# Patient Record
Sex: MALE | Race: WHITE | NOT HISPANIC OR LATINO | Employment: FULL TIME | URBAN - METROPOLITAN AREA
[De-identification: names, ages, dates, MRNs, and addresses within clinical notes are randomized per-mention and may not be internally consistent; named-entity substitution may affect disease eponyms.]

---

## 2023-10-19 RX ORDER — OXYCODONE AND ACETAMINOPHEN 5; 325 MG/1; MG/1
TABLET ORAL
COMMUNITY

## 2023-10-20 ENCOUNTER — ANCILLARY PROCEDURE (OUTPATIENT)
Dept: NEUROLOGY | Facility: CLINIC | Age: 53
End: 2023-10-20
Payer: COMMERCIAL

## 2023-10-20 VITALS
DIASTOLIC BLOOD PRESSURE: 94 MMHG | BODY MASS INDEX: 27.72 KG/M2 | HEART RATE: 93 BPM | WEIGHT: 216 LBS | SYSTOLIC BLOOD PRESSURE: 161 MMHG | HEIGHT: 74 IN | TEMPERATURE: 97.7 F

## 2023-10-20 DIAGNOSIS — G56.22 ULNAR NEUROPATHY AT ELBOW OF LEFT UPPER EXTREMITY: ICD-10-CM

## 2023-10-20 DIAGNOSIS — G56.82 SUPRASCAPULAR NEUROPATHY, LEFT: Primary | ICD-10-CM

## 2023-10-20 PROCEDURE — 95886 MUSC TEST DONE W/N TEST COMP: CPT | Performed by: PSYCHIATRY & NEUROLOGY

## 2023-10-20 PROCEDURE — 95909 NRV CNDJ TST 5-6 STUDIES: CPT | Performed by: PSYCHIATRY & NEUROLOGY

## 2024-10-22 ENCOUNTER — TELEPHONE (OUTPATIENT)
Dept: ORTHOPEDIC SURGERY | Facility: HOSPITAL | Age: 54
End: 2024-10-22
Payer: COMMERCIAL

## 2024-10-22 NOTE — TELEPHONE ENCOUNTER
Called and left voicemail for Clemente Coronado to call us back to get scheduled for an appointment with Dr. Trevizo as soon as possible per Bessie.

## 2024-11-11 ENCOUNTER — APPOINTMENT (OUTPATIENT)
Dept: ORTHOPEDIC SURGERY | Facility: CLINIC | Age: 54
End: 2024-11-11
Payer: COMMERCIAL

## 2024-11-11 ENCOUNTER — HOSPITAL ENCOUNTER (OUTPATIENT)
Dept: RADIOLOGY | Facility: CLINIC | Age: 54
Discharge: HOME | End: 2024-11-11
Payer: COMMERCIAL

## 2024-11-11 VITALS — BODY MASS INDEX: 26.36 KG/M2 | HEIGHT: 75 IN | WEIGHT: 212 LBS

## 2024-11-11 DIAGNOSIS — M75.101 TEAR OF RIGHT ROTATOR CUFF, UNSPECIFIED TEAR EXTENT, UNSPECIFIED WHETHER TRAUMATIC: ICD-10-CM

## 2024-11-11 DIAGNOSIS — M25.811 SHOULDER IMPINGEMENT, RIGHT: ICD-10-CM

## 2024-11-11 DIAGNOSIS — S43.431A DEGENERATIVE SUPERIOR LABRAL ANTERIOR-TO-POSTERIOR (SLAP) TEAR OF RIGHT SHOULDER: ICD-10-CM

## 2024-11-11 DIAGNOSIS — M25.511 RIGHT SHOULDER PAIN, UNSPECIFIED CHRONICITY: ICD-10-CM

## 2024-11-11 DIAGNOSIS — M65.911 SYNOVITIS OF RIGHT SHOULDER: ICD-10-CM

## 2024-11-11 DIAGNOSIS — S46.011A TRAUMATIC COMPLETE TEAR OF RIGHT ROTATOR CUFF, INITIAL ENCOUNTER: Primary | ICD-10-CM

## 2024-11-11 PROCEDURE — 3008F BODY MASS INDEX DOCD: CPT | Performed by: STUDENT IN AN ORGANIZED HEALTH CARE EDUCATION/TRAINING PROGRAM

## 2024-11-11 PROCEDURE — 99204 OFFICE O/P NEW MOD 45 MIN: CPT | Performed by: STUDENT IN AN ORGANIZED HEALTH CARE EDUCATION/TRAINING PROGRAM

## 2024-11-11 PROCEDURE — 73030 X-RAY EXAM OF SHOULDER: CPT | Mod: RT

## 2024-11-11 PROCEDURE — 1036F TOBACCO NON-USER: CPT | Performed by: STUDENT IN AN ORGANIZED HEALTH CARE EDUCATION/TRAINING PROGRAM

## 2024-11-11 PROCEDURE — 73030 X-RAY EXAM OF SHOULDER: CPT | Mod: RIGHT SIDE | Performed by: RADIOLOGY

## 2024-11-11 ASSESSMENT — PAIN SCALES - GENERAL: PAINLEVEL_OUTOF10: 8

## 2024-11-11 ASSESSMENT — PAIN - FUNCTIONAL ASSESSMENT: PAIN_FUNCTIONAL_ASSESSMENT: 0-10

## 2024-11-11 NOTE — PROGRESS NOTES
PRIMARY CARE PHYSICIAN: No primary care provider on file.  REFERRING PROVIDER: No referring provider defined for this encounter.     CONSULT ORTHOPAEDIC: Shoulder Evaluation    ASSESSMENT & PLAN    Impression: 54 y.o. male with an acute right shoulder injury concerning for a new full thickness rotator cuff tear    Plan:   I explained to the patient the nature of their diagnosis.  I reviewed their imaging studies with them.    Based on the history, physical exam and imaging studies above, the patient's presentation is consistent with the above diagnosis.  I had a long discussion with the patient regarding their presentation and the treatment options.  We discussed initial nonoperative versus operative management options as well as potential further diagnostic imaging. Patient's mechanism of injury, symptoms and physical exam findings are concerning for a new full-thickness rotator cuff tear of the right shoulder. He has significant pain and limitations to the right shoulder since his injury. At this time I recommend proceeding with further diagnostic imaging with an MRI of the right shoulder. The MRI is medically necessary and indicated given his complaints and physical exam findings as described below . The MRI will be used for potential presurgical planning purposes. The MRI should be performed in a hospital setting so the surgeon has immediate access to the images. Patient will follow-up with me once the MRI is completed to review the images and discuss a treatment plan going forward.    Follow-Up: Patient will follow-up once the MRI is completed to review the images and discuss a treatment plan going forward.    At the end of the visit, all questions were answered in full. The patient is in agreement with the plan and recommendations. They will call the office with any questions/concerns.    Post visit addendum:  I called the patient to review his MRI findings with him.  He has a massive rotator cuff tear  involving the entirety of the supraspinatus, anterior aspect of the infraspinatus and the subscapularis; findings consistent with prior biceps tenodesis, degenerative fraying of the labrum without significant degenerative change of the glenohumeral joint, shoulder bursitis, no significant rotator cuff muscle belly atrophy.  After long discussion with the patient, I do recommend surgical invention to repair his rotator cuff.  I believe this will be able to be repaired primarily.  I do not believe he is a candidate for shoulder arthroplasty.  He expressed understanding.  The plan will be for a right shoulder arthroscopy, rotator cuff repair, subacromial decompression acromioplasty, extensive intra-articular debridement and synovectomy.  I thoroughly explained the risks and benefits as well as the expected postoperative timeline for the proposed procedure versus nonoperative management. Risks of this procedure include but are not limited to bleeding, infection, nerve injury, DVT and failure of repair or implant.  The patient expressed understanding and wished to proceed with surgical intervention.  All questions were answered. We will begin the presurgical process and find them a surgical date in a timely fashion that works for them.  He will call the office once he discusses a surgical date with his office.    The patient will require an abduction sling for postoperative joint stability. Additionally, in order to decrease the amount of narcotic pain medication usage and improve their pain control and swelling, I recommend a cryotherapy machine.    Note dictated with [employer] software. Completed without full typed error editing and sent to avoid delay.       SUBJECTIVE  CHIEF COMPLAINT:   Chief Complaint   Patient presents with    Right Shoulder - Pain        HPI: Clemente Coronado is a 54 y.o. patient who presents today with right shoulder pain. Patient injured the right shoulder approximately 3 weeks  ago when was playing volleyball and lunged/reached for a ball. He felt immediate sharp pain localized to the right shoulder and was unable to keep playing. He rested and iced but his discomfort persisted. Approximately one week ago he re-injured his shoulder when throwing a stick into his yard. Since then he has had worsening pain. His pain radiates down his arm and he has lost ROM. Unable to sleep at night due to the pain. He also describes a significant lack of strength and has been unable to perform his work requirements. Patient does have a Hx of a right rotator cuff repair done 6/2021. He had no issues after his surgery and recovered well until this most recent injury. He feels as though his shoulder is swollen. He has been taking Advil as needed.     They deny any associated neck pain.  No numbness, tingling, or paresthesias.    REVIEW OF SYSTEMS  Constitutional: See HPI for pain assessment, No significant weight loss, recent trauma  Cardiovascular: No chest pain, shortness of breath  Respiratory: No difficulty breathing, cough  Gastrointestinal: No nausea, vomiting, diarrhea, constipation  Musculoskeletal: Noted in HPI, positive for pain, restricted motion, stiffness  Integumentary: No rashes, easy bruising, redness   Neurological: no numbness or tingling in extremities, no gait disturbances   Psychiatric: No mood changes, memory changes, social issues  Heme/Lymph: no excessive swelling, easy bruising, excessive bleeding  ENT: No hearing changes  Eyes: No vision changes    No past medical history on file.     No Known Allergies     No past surgical history on file.     No family history on file.     Social History     Socioeconomic History    Marital status:      Spouse name: Not on file    Number of children: Not on file    Years of education: Not on file    Highest education level: Not on file   Occupational History    Not on file   Tobacco Use    Smoking status: Never    Smokeless tobacco: Never  "  Substance and Sexual Activity    Alcohol use: Yes     Comment: Socially    Drug use: Never    Sexual activity: Not on file   Other Topics Concern    Not on file   Social History Narrative    Not on file     Social Drivers of Health     Financial Resource Strain: Not on file   Food Insecurity: Not on file   Transportation Needs: Not on file   Physical Activity: Not on file   Stress: Not on file   Social Connections: Not on file   Intimate Partner Violence: Not on file   Housing Stability: Not on file        CURRENT MEDICATIONS:   Current Outpatient Medications   Medication Sig Dispense Refill    oxyCODONE-acetaminophen (Percocet) 5-325 mg tablet        No current facility-administered medications for this visit.        OBJECTIVE    PHYSICAL EXAM      10/20/2023     3:17 PM   Vitals   Systolic 161   Diastolic 94   Heart Rate 93   Temp 36.5 °C (97.7 °F)   Height (in) 1.88 m (6' 2\")   Weight (lb) 216   BMI 27.73 kg/m2   BSA (m2) 2.26 m2      There is no height or weight on file to calculate BMI.    GENERAL: A/Ox3, NAD. Appears healthy, well nourished  PSYCHIATRIC: Mood stable, appropriate memory recall  EYES: EOM intact, no scleral icterus  CARDIOVASCULAR: Palpable peripheral pulses  LUNGS: Breathing non-labored on room air  SKIN: no erythema, rashes, or ecchymosis     MUSCULOSKELETAL:  Laterality: right Shoulder Exam  - Negative Spurlings, full painless neck ROM  - Skin intact  - No erythema or warmth  - No ecchymosis or soft tissue swelling  - Shoulder ROM:  forward flexion 140 with a hitch at 90, ER 40, unable to perform IR without significant pain  - Strength:      Jobes 5-/5     ER 5-/5     Belly press and bearhug 4/5  - Palpation: positive TTP subacromial space and biceps groove  - Araujo and Neers positive  - Speeds and O'Briens positive    NEUROVASCULAR:  - Neurovascular Status: sensation intact to light touch distally, upper extremity motor grossly intact  - Capillary refill brisk at extremities, Bilateral " peripheral pulses 2+    Imaging: Multiple views of the affected right shoulder(s) demonstrate: no acute osseous abnormality, no fracture, no significant degenerative changes.   X-rays were personally reviewed and interpreted by me.  Radiology reports were reviewed by me as well, if readily available at the time.      Alfonzo Trevizo MD  Attending Surgeon    Sports Medicine Orthopaedic Surgery  [facility] [employer] [facility] [employer]

## 2024-11-12 ENCOUNTER — HOSPITAL ENCOUNTER (OUTPATIENT)
Dept: RADIOLOGY | Facility: CLINIC | Age: 54
Discharge: HOME | End: 2024-11-12
Payer: COMMERCIAL

## 2024-11-12 DIAGNOSIS — M75.101 TEAR OF RIGHT ROTATOR CUFF, UNSPECIFIED TEAR EXTENT, UNSPECIFIED WHETHER TRAUMATIC: ICD-10-CM

## 2024-11-12 PROCEDURE — 73221 MRI JOINT UPR EXTREM W/O DYE: CPT | Mod: RIGHT SIDE | Performed by: RADIOLOGY

## 2024-11-12 PROCEDURE — 73221 MRI JOINT UPR EXTREM W/O DYE: CPT | Mod: RT

## 2024-11-18 ENCOUNTER — TELEPHONE (OUTPATIENT)
Dept: ORTHOPEDIC SURGERY | Facility: CLINIC | Age: 54
End: 2024-11-18
Payer: COMMERCIAL

## 2024-11-18 DIAGNOSIS — M75.101 TEAR OF RIGHT ROTATOR CUFF, UNSPECIFIED TEAR EXTENT, UNSPECIFIED WHETHER TRAUMATIC: ICD-10-CM

## 2024-11-18 PROBLEM — S46.011A TRAUMATIC COMPLETE TEAR OF RIGHT ROTATOR CUFF: Status: ACTIVE | Noted: 2024-11-11

## 2024-11-18 PROBLEM — M65.911 SYNOVITIS OF RIGHT SHOULDER: Status: ACTIVE | Noted: 2024-11-11

## 2024-11-18 PROBLEM — M25.811 SHOULDER IMPINGEMENT, RIGHT: Status: ACTIVE | Noted: 2024-11-11

## 2024-11-18 PROBLEM — S43.431A DEGENERATIVE SUPERIOR LABRAL ANTERIOR-TO-POSTERIOR (SLAP) TEAR OF RIGHT SHOULDER: Status: ACTIVE | Noted: 2024-11-11

## 2024-11-18 NOTE — TELEPHONE ENCOUNTER
Place case request for 12/3/24.  Postop scheduled, calendar updated and PAT ordered.    FYI,   He would like 12/6/24 if we happen to get an cancellation.

## 2024-11-18 NOTE — PROGRESS NOTES
Place case request for 12/3/24  Postop scheduled, calendar updated and PAT ordered    If we happen to have an opening on 12/6/24 that day would be better for them.

## 2024-11-25 ENCOUNTER — APPOINTMENT (OUTPATIENT)
Dept: PREADMISSION TESTING | Facility: HOSPITAL | Age: 54
End: 2024-11-25
Payer: COMMERCIAL

## 2024-11-29 ENCOUNTER — PRE-ADMISSION TESTING (OUTPATIENT)
Dept: PREADMISSION TESTING | Facility: HOSPITAL | Age: 54
End: 2024-11-29
Payer: COMMERCIAL

## 2024-11-29 VITALS
DIASTOLIC BLOOD PRESSURE: 86 MMHG | WEIGHT: 216.71 LBS | HEART RATE: 72 BPM | TEMPERATURE: 97.2 F | BODY MASS INDEX: 26.95 KG/M2 | HEIGHT: 75 IN | SYSTOLIC BLOOD PRESSURE: 147 MMHG | RESPIRATION RATE: 16 BRPM | OXYGEN SATURATION: 100 %

## 2024-11-29 DIAGNOSIS — Z01.818 PREOP TESTING: Primary | ICD-10-CM

## 2024-11-29 DIAGNOSIS — M75.101 TEAR OF RIGHT ROTATOR CUFF, UNSPECIFIED TEAR EXTENT, UNSPECIFIED WHETHER TRAUMATIC: ICD-10-CM

## 2024-11-29 LAB
ALBUMIN SERPL BCP-MCNC: 4.7 G/DL (ref 3.4–5)
ALP SERPL-CCNC: 72 U/L (ref 33–120)
ALT SERPL W P-5'-P-CCNC: 21 U/L (ref 10–52)
ANION GAP SERPL CALC-SCNC: 13 MMOL/L (ref 10–20)
AST SERPL W P-5'-P-CCNC: 22 U/L (ref 9–39)
BASOPHILS # BLD AUTO: 0.04 X10*3/UL (ref 0–0.1)
BASOPHILS NFR BLD AUTO: 0.6 %
BILIRUB SERPL-MCNC: 0.7 MG/DL (ref 0–1.2)
BUN SERPL-MCNC: 18 MG/DL (ref 6–23)
CALCIUM SERPL-MCNC: 10.1 MG/DL (ref 8.6–10.3)
CHLORIDE SERPL-SCNC: 102 MMOL/L (ref 98–107)
CO2 SERPL-SCNC: 27 MMOL/L (ref 21–32)
CREAT SERPL-MCNC: 0.93 MG/DL (ref 0.5–1.3)
EGFRCR SERPLBLD CKD-EPI 2021: >90 ML/MIN/1.73M*2
EOSINOPHIL # BLD AUTO: 0.28 X10*3/UL (ref 0–0.7)
EOSINOPHIL NFR BLD AUTO: 3.9 %
ERYTHROCYTE [DISTWIDTH] IN BLOOD BY AUTOMATED COUNT: 12.1 % (ref 11.5–14.5)
GLUCOSE SERPL-MCNC: 86 MG/DL (ref 74–99)
HCT VFR BLD AUTO: 43.8 % (ref 41–52)
HGB BLD-MCNC: 14.3 G/DL (ref 13.5–17.5)
IMM GRANULOCYTES # BLD AUTO: 0.01 X10*3/UL (ref 0–0.7)
IMM GRANULOCYTES NFR BLD AUTO: 0.1 % (ref 0–0.9)
LYMPHOCYTES # BLD AUTO: 1.74 X10*3/UL (ref 1.2–4.8)
LYMPHOCYTES NFR BLD AUTO: 24 %
MCH RBC QN AUTO: 30.9 PG (ref 26–34)
MCHC RBC AUTO-ENTMCNC: 32.6 G/DL (ref 32–36)
MCV RBC AUTO: 95 FL (ref 80–100)
MONOCYTES # BLD AUTO: 0.84 X10*3/UL (ref 0.1–1)
MONOCYTES NFR BLD AUTO: 11.6 %
NEUTROPHILS # BLD AUTO: 4.33 X10*3/UL (ref 1.2–7.7)
NEUTROPHILS NFR BLD AUTO: 59.8 %
NRBC BLD-RTO: NORMAL /100{WBCS}
PLATELET # BLD AUTO: 235 X10*3/UL (ref 150–450)
POTASSIUM SERPL-SCNC: 4.2 MMOL/L (ref 3.5–5.3)
PROT SERPL-MCNC: 7.8 G/DL (ref 6.4–8.2)
RBC # BLD AUTO: 4.63 X10*6/UL (ref 4.5–5.9)
SODIUM SERPL-SCNC: 138 MMOL/L (ref 136–145)
WBC # BLD AUTO: 7.2 X10*3/UL (ref 4.4–11.3)

## 2024-11-29 PROCEDURE — 85025 COMPLETE CBC W/AUTO DIFF WBC: CPT

## 2024-11-29 PROCEDURE — 80053 COMPREHEN METABOLIC PANEL: CPT

## 2024-11-29 PROCEDURE — 99203 OFFICE O/P NEW LOW 30 MIN: CPT

## 2024-11-29 PROCEDURE — 36415 COLL VENOUS BLD VENIPUNCTURE: CPT

## 2024-11-29 ASSESSMENT — DUKE ACTIVITY SCORE INDEX (DASI)
CAN YOU DO LIGHT WORK AROUND THE HOUSE LIKE DUSTING OR WASHING DISHES: YES
CAN YOU RUN A SHORT DISTANCE: YES
CAN YOU WALK A BLOCK OR TWO ON LEVEL GROUND: YES
CAN YOU DO MODERATE WORK AROUND THE HOUSE LIKE VACUUMING, SWEEPING FLOORS OR CARRYING GROCERIES: YES
CAN YOU DO YARD WORK LIKE RAKING LEAVES, WEEDING OR PUSHING A MOWER: YES
DASI METS SCORE: 9.9
CAN YOU DO HEAVY WORK AROUND THE HOUSE LIKE SCRUBBING FLOORS OR LIFTING AND MOVING HEAVY FURNITURE: YES
TOTAL_SCORE: 58.2
CAN YOU HAVE SEXUAL RELATIONS: YES
CAN YOU CLIMB A FLIGHT OF STAIRS OR WALK UP A HILL: YES
CAN YOU TAKE CARE OF YOURSELF (EAT, DRESS, BATHE, OR USE TOILET): YES
CAN YOU PARTICIPATE IN MODERATE RECREATIONAL ACTIVITIES LIKE GOLF, BOWLING, DANCING, DOUBLES TENNIS OR THROWING A BASEBALL OR FOOTBALL: YES
CAN YOU PARTICIPATE IN STRENOUS SPORTS LIKE SWIMMING, SINGLES TENNIS, FOOTBALL, BASKETBALL, OR SKIING: YES
CAN YOU WALK INDOORS, SUCH AS AROUND YOUR HOUSE: YES

## 2024-11-29 ASSESSMENT — PAIN SCALES - GENERAL: PAINLEVEL_OUTOF10: 5 - MODERATE PAIN

## 2024-11-29 ASSESSMENT — PAIN DESCRIPTION - DESCRIPTORS: DESCRIPTORS: ACHING;SHOOTING

## 2024-11-29 ASSESSMENT — PAIN - FUNCTIONAL ASSESSMENT: PAIN_FUNCTIONAL_ASSESSMENT: 0-10

## 2024-11-29 NOTE — H&P (VIEW-ONLY)
[facility]/PAT Evaluation       Name: Clemente Coronado (Clemente Coronado)  /Age: 1970/54 y.o.     In-Person       Chief Complaint: Tear of right rotator cuff, unspecified tear extent, unspecified whether traumatic, Degenerative superior labral anterior-to-posterior (SLAP) tear of right shoulder, Traumatic complete tear of right rotator cuff, initial encounter, Synovitis of right shoulder    HPI  Patient is an alert and oriented 54 year old male scheduled for a right shoulder arthroscopy, rotator cuff repair, subacromial decompression acromioplasty, extensive intra-articular debridement and synovectomy on 12/3/2024 with Dr. Trevizo for tear of right rotator cuff, unspecified tear extent, unspecified whether traumatic, Degenerative superior labral anterior-to-posterior (SLAP) tear of right shoulder, Traumatic complete tear of right rotator cuff, initial encounter, Synovitis of right shoulder. He endorses mild right shoulder pain which worsens on movement/lifting. PMHX includes OA. Presents to [facility] today for perioperative risk stratification and optimization.     Past Medical History:   Diagnosis Date    OA (osteoarthritis)      Past Surgical History:   Procedure Laterality Date    ARTHROSCOPY SHOULDER W/ OPEN ROTATOR CUFF REPAIR Bilateral     KNEE ARTHROSCOPY W/ DEBRIDEMENT Bilateral     8 total knee arthroscopies    TONSILLECTOMY      TOTAL KNEE ARTHROPLASTY Left      Patient  has no history on file for sexual activity.    No family history on file.    No Known Allergies    Prior to Admission medications    Medication Sig Start Date End Date Taking? Authorizing Provider   oxyCODONE-acetaminophen (Percocet) 5-325 mg tablet     Historical Provider, MD       Review of Systems   Constitutional: Negative for chills, decreased appetite, diaphoresis, fever and malaise/fatigue.   Eyes:  Negative for blurred vision and double vision.   Cardiovascular:  Negative for chest pain, claudication, cyanosis, dyspnea on exertion, irregular  heartbeat, leg swelling, near-syncope and palpitations.   Respiratory:  Negative for cough, hemoptysis, shortness of breath and wheezing.    Endocrine: Negative for cold intolerance, heat intolerance, polydipsia, polyphagia and polyuria.   Gastrointestinal:  Negative for abdominal pain, constipation, diarrhea, dysphagia, nausea and vomiting.   Genitourinary:  Negative for bladder incontinence, dysuria, hematuria, incomplete emptying, nocturia, frequency, pelvic pain and urgency.   Neurological:  Negative for headaches, light-headedness, paresthesias, sensory change and weakness.   Psychiatric/Behavioral:  Negative for altered mental status.    Musculoskeletal: Negative for myalgias. Positive for arthralgias     Vitals and nursing note reviewed.     Physical exam  Constitutional:       Appearance: Normal appearance. He is Overweight.   HENT:      Head: Normocephalic and atraumatic.      Mouth/Throat:      Mouth: Mucous membranes are moist.      Pharynx: Oropharynx is clear.   Eyes:      Extraocular Movements: Extraocular movements intact.      Conjunctiva/sclera: Conjunctivae normal.      Pupils: Pupils are equal, round, and reactive to light.   Cardiovascular:      PMI at left midclavicular line. Normal rate. Regular rhythm. Normal S1. Normal S2.       Murmurs: There is no murmur.      No gallop.  No click. No rub.       No audible carotid bruit     No lower extremity edema on exam  Pulmonary:      Effort: Pulmonary effort is normal.      Breath sounds: Normal breath sounds.   Abdominal:      General: Abdomen is flat. Bowel sounds are normal.      Palpations: Abdomen is soft and non-tender  Musculoskeletal:      Cervical back: Normal range of motion and neck supple.      Shoulder, right: Limited ROM  Skin:     General: Skin is warm and dry.      Capillary Refill: Capillary refill takes less than 2 seconds.   Neurological:      General: No focal deficit present.      Mental Status: He is alert and oriented to person,  "place, and time. Mental status is at baseline.   Psychiatric:         Mood and Affect: Mood normal.         Behavior: Behavior normal.         Thought Content: Thought content normal.         Judgment: Judgment normal.     Vitals and nursing note reviewed. Physical exam within normal limits.     Visit Vitals  /86   Pulse 72   Temp 36.2 °C (97.2 °F) (Temporal)   Resp 16   Ht 1.905 m (6' 3\")   Wt 98.3 kg (216 lb 11.4 oz)   SpO2 100%   BMI 27.09 kg/m²   Smoking Status Never   BSA 2.28 m²      DASI Risk Score      Flowsheet Row Pre-Admission Testing from 11/29/2024 in [facility]   Can you take care of yourself (eat, dress, bathe, or use toilet)?  2.75 filed at 11/29/2024 1649   Can you walk indoors, such as around your house? 1.75 filed at 11/29/2024 1649   Can you walk a block or two on level ground?  2.75 filed at 11/29/2024 1649   Can you climb a flight of stairs or walk up a hill? 5.5 filed at 11/29/2024 1649   Can you run a short distance? 8 filed at 11/29/2024 1649   Can you do light work around the house like dusting or washing dishes? 2.7 filed at 11/29/2024 1649   Can you do moderate work around the house like vacuuming, sweeping floors or carrying groceries? 3.5 filed at 11/29/2024 1649   Can you do heavy work around the house like scrubbing floors or lifting and moving heavy furniture?  8 filed at 11/29/2024 1649   Can you do yard work like raking leaves, weeding or pushing a mower? 4.5 filed at 11/29/2024 1649   Can you have sexual relations? 5.25 filed at 11/29/2024 1649   Can you participate in moderate recreational activities like golf, bowling, dancing, doubles tennis or throwing a baseball or football? 6 filed at 11/29/2024 1649   Can you participate in strenous sports like swimming, singles tennis, football, basketball, or skiing? 7.5 filed at 11/29/2024 1649   DASI SCORE 58.2 filed at 11/29/2024 1649   METS Score (Will be calculated only when all the questions are answered) 9.9 " filed at 11/29/2024 1649          Capmarciei DVT Assessment      Flowsheet Row Pre-Admission Testing from 11/29/2024 in [facility]   DVT Score 4 filed at 11/29/2024 1649   Surgical Factors Major surgery planned, including arthroscopic and laproscopic (1-2 hours) filed at 11/29/2024 1649   BMI 30 or less filed at 11/29/2024 1649          Modified Frailty Index      Flowsheet Row Pre-Admission Testing from 11/29/2024 in [facility]   Non-independent functional status (problems with dressing, bathing, personal grooming, or cooking) 0 filed at 11/29/2024 1649   History of diabetes mellitus  0 filed at 11/29/2024 1649   History of COPD 0 filed at 11/29/2024 1649   History of CHF No filed at 11/29/2024 1649   History of MI 0 filed at 11/29/2024 1649   History of Percutaneous Coronary Intervention, Cardiac Surgery, or Angina No filed at 11/29/2024 1649   Hypertension requiring the use of medication  0 filed at 11/29/2024 1649   Peripheral vascular disease 0 filed at 11/29/2024 1649   Impaired sensorium (cognitive impairement or loss, clouding, or delirium) 0 filed at 11/29/2024 1649   TIA or CVA withouy residual deficit 0 filed at 11/29/2024 1649   Cerebrovascular accident with deficit 0 filed at 11/29/2024 1649   Modified Frailty Index Calculator 0 filed at 11/29/2024 1649          CHADS2 Stroke Risk  Current as of 6 minutes ago        N/A 3 to 100%: High Risk   2 to < 3%: Medium Risk   0 to < 2%: Low Risk     Last Change: N/A          This score determines the patient's risk of having a stroke if the patient has atrial fibrillation.        This score is not applicable to this patient. Components are not calculated.          Revised Cardiac Risk Index      Flowsheet Row Pre-Admission Testing from 11/29/2024 in [facility]   High-Risk Surgery (Intraperitoneal, Intrathoracic,Suprainguinal vascular) 0 filed at 11/29/2024 1649   History of ischemic heart disease (History of MI,  History of positive exercuse test, Current chest paint considered due to myocardial ischemia, Use of nitrate therapy, ECG with pathological Q Waves) 0 filed at 11/29/2024 1649   History of congestive heart failure (pulmonary edemia, bilateral rales or S3 gallop, Paroxysmal nocturnal dyspnea, CXR showing pulmonary vascular redistribution) 0 filed at 11/29/2024 1649   History of cerebrovascular disease (Prior TIA or stroke) 0 filed at 11/29/2024 1649   Pre-operative insulin treatment 0 filed at 11/29/2024 1649   Pre-operative creatinine>2 mg/dl 0 filed at 11/29/2024 1649   Revised Cardiac Risk Calculator 0 filed at 11/29/2024 1649          Apfel Simplified Score    No data to display       Risk Analysis Index Results This Encounter    No data found in the last 10 encounters.       Stop Bang Score      Flowsheet Row Pre-Admission Testing from 11/29/2024 in [facility]   Do you snore loudly? 0 filed at 11/29/2024 1632   Do you often feel tired or fatigued after your sleep? 0 filed at 11/29/2024 1632   Has anyone ever observed you stop breathing in your sleep? 0 filed at 11/29/2024 1632   Do you have or are you being treated for high blood pressure? 0 filed at 11/29/2024 1632   Recent BMI (Calculated) 26.5 filed at 11/29/2024 1632   Is BMI greater than 35 kg/m2? 0=No filed at 11/29/2024 1632   Age older than 50 years old? 1=Yes filed at 11/29/2024 1632   Is your neck circumference greater than 17 inches (Male) or 16 inches (Female)? 0 filed at 11/29/2024 1632   Gender - Male 1=Yes filed at 11/29/2024 1632   STOP-BANG Total Score 2 filed at 11/29/2024 1632          Prodigy: High Risk  Total Score: 8              Prodigy Gender Score          ARISCAT Score for Postoperative Pulmonary Complications      Flowsheet Row Pre-Admission Testing from 11/29/2024 in [facility]   Age, years  3 filed at 11/29/2024 1649   Preoperative SpO2 0 filed at 11/29/2024 1649   Respiratory infection in the last  month Either upper or lower (i.e., URI, bronchitis, pneumonia), with fever and antibiotic treatment 0 filed at 11/29/2024 1649   Preoperative anemoa (Hgb less than 10 g/dl) 0 filed at 11/29/2024 1649   Surgical incision  0 filed at 11/29/2024 1649   Duration of surgery  0 filed at 11/29/2024 1649   Emergency Procedure  0 filed at 11/29/2024 1649   ARISCAT Total Score  3 filed at 11/29/2024 1649          Maninder Perioperative Risk for Myocardial Infarction or Cardiac Arrest (ADALI)      Flowsheet Row Pre-Admission Testing from 11/29/2024 in [facility]   Age 1.12 filed at 11/29/2024 1650   Functional Status  0 filed at 11/29/2024 1650   ASA Class  -5.17 filed at 11/29/2024 1650   Creatinine 0 filed at 11/29/2024 1650   Type of Procedure  0.80 filed at 11/29/2024 1650   ADALI Total Score  -8.5 filed at 11/29/2024 1650   ADALI % 0.02 filed at 11/29/2024 1650          Assessment & Plan:    Neuro:  No diagnosis or significant findings on chart review or clinical presentation and evaluation.     HEENT/Airway:  No diagnosis or significant findings on chart review or clinical presentation and evaluation.   STOP-BANG Score-2 points low risk for BRENDA    Mallampati::  II    TM distance::  >3 FB    Neck ROM::  Full  Dentures-denies  Crowns-denies  Implants-denies    Cardiovascular:  No diagnosis or significant findings on chart review or clinical presentation and evaluation.   METS: 9.9  RCRI: 0 points, 3.9%  risk for postoperative MACE   ADALI: 0.02% risk for postoperative MACE    Pulmonary:  No diagnosis or significant findings on chart review or clinical presentation and evaluation.   ARISCAT: <26 points, 1.6% risk of in-hospital postoperative pulmonary complication  PRODIGY: Low risk for opioid induced respiratory depression  Smoking History-He has never smoked.  Discussed smoking cessation and deep breathing handout given    Renal/Urinary:  No diagnosis or significant findings on chart review or clinical  presentation and evaluation, however, the patient is at increased risk of perioperative renal complications secondary to age>/= 56 and male sex. Preventative measures include BP monitoring, medication compliance, and hydration management.   CMP-Reviewed, stable  Creatinine-0.93  GFR->90    Endocrine:  No diagnosis or significant findings on chart review or clinical presentation and evaluation.     Hematologic/Immunology:  No diagnosis or significant findings on chart review or clinical presentation and evaluation.  The patient is not a Jehovah’s witness and will accept blood and blood products if medically indicated.   History of previous blood transfusions No  CBC-Reviewed, stable  HGB-14.3  Caprini Score 4, patient at Moderate for postoperative DVT. Pt supplied education/VTE handout  Anticoagulation use: No     Gastrointestinal:   No diagnosis or significant findings on chart review or clinical presentation and evaluation.   Recreational drug use: Drug use No  Alcohol use social drinker    Infectious disease:   No diagnosis or significant findings on chart review or clinical presentation and evaluation.     Musculoskeletal:   No diagnosis or significant findings on chart review or clinical presentation and evaluation.   JHFRAT score-0 points. low risk for falls    Anesthesia:  ASA 1 - Normal health patient  Anticipated anesthesia-Regional  History of General anesthesia- yes  Complications- No anesthesia complications  No family history of anesthesia complications    Abnormalities noted on PAT evaluation: No    Labs & Imaging ordered:  CBC, CMP  Nickel/metal allergy-negative  Shellfish allergy-negative    Overall, patient Low Risk for the scheduled Moderate Risk surgery. Discussed with patient medication instructions, NPO guidelines, and any questions or concerns.     Face to face time-25 minutes

## 2024-11-29 NOTE — DISCHARGE INSTRUCTIONS
Alfozno Trevizo M.D.   Sports Medicine Orthopaedic Surgery    [facility]  [address] [facility]                  [address] [facility]       [address]  [facility], [address]   Phone: [phone]         Phone: [phone]       Phone: [phone]   Fax: [phone]                         Fax: [phone]       Fax: [phone]     After hours phone number: [phone]    AFTER SURGERY     Anesthesia  If you received a nerve block during surgery, you may have numbness or inability to move the limb. Do not be alarmed as this may last 8-36 hours depending upon the amount and type of medication used by the anesthesiologist. Make sure If you are experiencing numbness after 36 hours, please call the office. When the nerve block begins to wear off, you will feel a tingling sensation, like pins and needles. It is important that you start taking the pain medication at that time to ensure that you “stay ahead of the pain.” It is important to take the pain medicine when the pain level is a 4 or 5/10, before it gets too high.    Do not operate heavy machinery, make major decisions, drink alcohol or smoke for 24 hours. Do not drink alcohol while taking pain medications.    Prescribed Medications   Narcotic pain medicine (Oxycodone): The goal of post-operative pain management is pain control, NOT pain elimination. You should expect some pain after surgery - this pain helps you protect itself while it is healing. Constipation, nausea, itching, and drowsiness are side effects of this type of medication. You should take an over-the-counter stool softener (Colace and/or Senna) while taking narcotics to prevent constipation. If you experience itching, over the counter Benadryl may be helpful. Narcotic pain medications often produce drowsiness and it is against the law to operate a vehicle  while taking these medications. If you are taking oxycodone, you should take acetaminophen (Tylenol) around the clock to decrease baseline pain. Do not take Tylenol-containing products while on Percocet or Rembert.   Refill Policy: For concerns over your safety due to the rising opioid addiction epidemic in the [place], refills of your narcotic pain medications will only be provided on a case by case basis. Please use these medications judiciously.    Anti-inflammatory (NSAID) medicine (Naproxen or Mobic): These are both anti-inflammatory and pain relief. Do NOT take this medication if you have had an ulcer in the past unless you have cleared this with your primary care doctor. You should take NSAIDs with food or antacid to reduce the chance of upset stomach. Depending on your surgery, Dr. Trevizo may instruct you to avoid these medications.    Anti-nausea medicine (ondansetron/Zofran): sometimes patients experience nausea related to either anesthesia or the narcotic pain medication. If this is the case you will find this medication helpful.    DVT prophylaxis (Aspirin or Eliquis): For most patients, activity alone is sufficient to prevent dangerous blood clots, but in some cases your personal risk profile and/or the type of surgery you have undergone makes it necessary that you take medication to help prevent blood clots. Dr. Trevizo will inform you if you are to start one of these medications postoperatively.    Stool softener (Colace and/or Senna): are available over the counter at your local pharmacy and should be taken while you are taking narcotic pain medication to avoid constipation. You should stop taking these medications if you develop diarrhea. Over the counter laxatives may be used if you develop painful constipation.     Diet   Start with clear liquids (water, juice, Gatorade) and light foods (jello, soup, crackers). Progress to normal diet as tolerated if you are not nauseated. Avoid greasy or spicy  foods for the first 24hrs to avoid GI upset. Increase fluid intake to help prevent constipation.    Dressings / Wound Care  You may remove the outer dressing after 3 days and then can shower. (If you have a splint, please leave the splint in place until follow-up.) Do not remove Steri-strips (white stickers) if present over your incisions. Steri-strips may fall off on their own, which is normal. After the bandage has been removed, you may leave the incisions open to air. Alternatively, if you prefer to keep them covered, you may do so with Band-Aids, a light gauze dressing, or a clean ACE wrap. You may shower after the bandage has been removed (3 days), but it is very important that you pat the wounds dry after the shower. It is OK to allow soap and water to run over the wound - DO NOT soak or scrub the wound. Outside of the shower, keep your incision clean and dry until your first postoperative visit, approximately 10-14 days after surgery. You may remove your sling or brace to shower, unless otherwise instructed. As your balance may be affected by recent surgery, we recommend placing a plastic chair in the shower to help prevent falls. Do NOT take baths, go into a pool, or soak the operative site until approved by Dr. Trevizo.    Bracing / Physical Therapy   If you were given one, make sure you wear sling or brace at all times until your follow-up with Dr. Trevizo. Only remove your sling or brace for physical therapy, home exercises, and hygiene. These are typically used for 4-6 weeks after surgery in order to protect the healing of the tissue.     Physical therapy is just as important to your recovery as the actual operation! If you were given a prescription for physical therapy, make sure you go to your appointments and do your exercises daily at home (especially motion exercises).     Ice is a very important part of your recovery. It helps reduce inflammation and improves pain control. You should ice a few times  each day for 20-30 minutes at a time. Please make sure there is something under the ice (clean towel, cloth, T-shirt) so that the ice doesn't directly contact your skin. If you ordered a commercially available ice machine (optional) and a compression setting is available, you should use LOW or no compression during the first 5 days. After that, you may increase compression setting as tolerated. If the compression is bothering you then do not use compression.    Driving / Travel  Ultimately, it is your judgment to decide when you are safe to drive, but if you are at all unsure, do not risk your life or someone else's. As a general guideline, you will not be able to drive for 4-6 weeks after surgery. You should certainly not drive while on narcotics pain medication or while in a brace or sling.     Avoid flights and long distance traveling for 6 weeks after surgery. It is important to discuss your travel plans with Dr. Trevizo, as additional medications may need to be prescribed to help prevent blood clots if certain travel is unavoidable.     Return to Work or School   Your return to work will depend on what surgery was done and what type of work you do. Please note that these are general guidelines, and there may be modifications based on your unique situation. Typically, you may return to sedentary work or school 3-7 days after surgery if pain is tolerable and you are no longer requiring narcotic pain medication. In conjunction with your input, Dr. Trevizo will determine when you may return to more physically rigorous demands.     If you had Knee or Hip Surgery   If your surgery involves a ligament reconstruction or tendon repair, you will typically be prescribed crutches for at least the first few days until pain and the postoperative protocol allows you to fully bear weight and also wear a brace for 4-6 weeks. If cartilage surgery or meniscus repair is performed, you may be on crutches for 6 weeks. Individual  rehabilitation guidelines will vary based upon the unique situation and surgery of every patient, but take these general guidelines into account when planning return to work.     If you had Shoulder Surgery   If your surgery involves a repair (rotator cuff repair, labral repair), you will have a sling on for six weeks after surgery. If you have a sling, you will need to wear it all day. Please wear the sling at all times except for bathing for the first 2 weeks minimum. As long as you can abide by the restrictions, you can return to work when you feel like you can do so safely. However, you will need to take into consideration driving and activities related to your job. You may be able to safely loosen it if you are able to keep your arm supported. Please understand that you will NOT be able to work with your arm away from your body, above shoulder level, or use your arm against gravity for approximately 8 weeks. For jobs that require physical labor, you may require four months or more to return to work. If your surgery does NOT involve a repair (subacromial decompression, distal clavicle excision, capsular release), then you will be in a sling for only a few days after surgery. When comfortable, you may return to work when ready to conduct normal activities of your job. Remember that you may be on narcotic pain medications and these should be discontinued prior to your return to work. For jobs that require physical labor, you may require 6 weeks or more to return to work.     If you had Elbow or Wrist Surgery   If your surgery involves a repair or reconstruction, you will have a splint and sling on followed by a brace for four to six weeks after surgery. As long as you can abide by the restrictions, you can return to work when you feel like you can do so safely. However, you will need to take into consideration driving and activities related to your job. If you have a sling, you will need to wear it all day unless  otherwise instructed. You may be able to safely loosen it if you are able to keep your arm supported. For jobs that require physical labor, you may require four months or more to return to work.    If you had Ankle Surgery   If your surgery involves a repair or reconstruction, you will have a splint followed by a walking boot for four to six weeks after surgery. As long as you can abide by the restrictions, you can return to work when you feel like you can do so safely. However, you will need to take into consideration driving and activities related to your job. For jobs that require physical labor, you may require four months or more to return to work.    Normal Sensations and Findings after Surgery:   PAIN: We do everything possible to make your pain/discomfort level tolerable, but some amount of pain is to be expected.   WARMTH: Mild warmth around the operative site is normal for up to 3 weeks.   REDNESS: Small amount of redness where the sutures enter the skin is normal. If redness worsens or spreads it is important that you contact the office.   DRAINAGE: A small amount is normal for the first 48-72 hours. If wounds continue to drain after this time (requiring multiple gauze changes per day), please contact the office.   NUMBNESS: Around the incision is common.   BRUISING: Is common and often tracks down the arm or leg due to gravity and results in an alarming appearance, but is common and will resolve with time.   FEVER: Low-grade fevers (less than 101.5°F) are common during the first week after surgery. You should drink plenty of fluids and breathe deeply.   CONSTIPATION: Post-operative pain medications as well as immobility can lead to constipation. Please consider taking an over the counter stool softener such as Colace and/or Senna if you experience constipation or if you have a history of constipation.    Follow-Up   A Follow-up appointment should be arranged for 10-14 days after surgery. If one has not  been provided, please call the office to schedule.       NOTIFY US IMMEDIATELY FOR ANY OF THE FOLLOWING:   Most orthopedic surgical procedures are uneventful. However, complications can occur. The following are things to be aware of in the immediate postoperative period.   FEVER - Temperature rises above 101.5°F or associated chills/sweats   WOUND - If you notice drainage more than 4 days after surgery, if the drainage turns yellows and foul smelling, if you need to change gauze multiple times per day, or if sutures become loose.   CARDIOVASCULAR - Chest pain, shortness of breath, palpitations, or fainting spells must be taken seriously. Go to the emergency room (or call 911) immediately for evaluation.   BLOOD CLOTS - Orthopaedic surgery patients are at risk for blood clots. While the risk is higher for lower extremity surgery, even those who have undergone upper extremity surgery are at an increased risk. Please notify Dr. Trevizo if you or someone in your family has had blood clots or any type of known clotting disorder. Signs of blood clots may include calf pain or cramping, diffuse swelling in the leg and foot, or chest pain and shortness of breath. Please call the office or go to the hospital if you recognize any of these symptoms.   NAUSEA - If you have severe vomiting, diarrhea, or constipation, or cannot keep any liquid down   URINARY RETENTION - If you cannot urinate the night after surgery, please go to the Emergency Room.

## 2024-11-29 NOTE — CPM/PAT H&P
[facility]/PAT Evaluation       Name: Clemente Coronado (Clemente Coronado)  /Age: 1970/54 y.o.     In-Person       Chief Complaint: Tear of right rotator cuff, unspecified tear extent, unspecified whether traumatic, Degenerative superior labral anterior-to-posterior (SLAP) tear of right shoulder, Traumatic complete tear of right rotator cuff, initial encounter, Synovitis of right shoulder    HPI  Patient is an alert and oriented 54 year old male scheduled for a right shoulder arthroscopy, rotator cuff repair, subacromial decompression acromioplasty, extensive intra-articular debridement and synovectomy on 12/3/2024 with Dr. Trevizo for tear of right rotator cuff, unspecified tear extent, unspecified whether traumatic, Degenerative superior labral anterior-to-posterior (SLAP) tear of right shoulder, Traumatic complete tear of right rotator cuff, initial encounter, Synovitis of right shoulder. He endorses mild right shoulder pain which worsens on movement/lifting. PMHX includes OA. Presents to [facility] today for perioperative risk stratification and optimization.     Past Medical History:   Diagnosis Date    OA (osteoarthritis)      Past Surgical History:   Procedure Laterality Date    ARTHROSCOPY SHOULDER W/ OPEN ROTATOR CUFF REPAIR Bilateral     KNEE ARTHROSCOPY W/ DEBRIDEMENT Bilateral     8 total knee arthroscopies    TONSILLECTOMY      TOTAL KNEE ARTHROPLASTY Left      Patient  has no history on file for sexual activity.    No family history on file.    No Known Allergies    Prior to Admission medications    Medication Sig Start Date End Date Taking? Authorizing Provider   oxyCODONE-acetaminophen (Percocet) 5-325 mg tablet     Historical Provider, MD       Review of Systems   Constitutional: Negative for chills, decreased appetite, diaphoresis, fever and malaise/fatigue.   Eyes:  Negative for blurred vision and double vision.   Cardiovascular:  Negative for chest pain, claudication, cyanosis, dyspnea on exertion, irregular  heartbeat, leg swelling, near-syncope and palpitations.   Respiratory:  Negative for cough, hemoptysis, shortness of breath and wheezing.    Endocrine: Negative for cold intolerance, heat intolerance, polydipsia, polyphagia and polyuria.   Gastrointestinal:  Negative for abdominal pain, constipation, diarrhea, dysphagia, nausea and vomiting.   Genitourinary:  Negative for bladder incontinence, dysuria, hematuria, incomplete emptying, nocturia, frequency, pelvic pain and urgency.   Neurological:  Negative for headaches, light-headedness, paresthesias, sensory change and weakness.   Psychiatric/Behavioral:  Negative for altered mental status.    Musculoskeletal: Negative for myalgias. Positive for arthralgias     Vitals and nursing note reviewed.     Physical exam  Constitutional:       Appearance: Normal appearance. He is Overweight.   HENT:      Head: Normocephalic and atraumatic.      Mouth/Throat:      Mouth: Mucous membranes are moist.      Pharynx: Oropharynx is clear.   Eyes:      Extraocular Movements: Extraocular movements intact.      Conjunctiva/sclera: Conjunctivae normal.      Pupils: Pupils are equal, round, and reactive to light.   Cardiovascular:      PMI at left midclavicular line. Normal rate. Regular rhythm. Normal S1. Normal S2.       Murmurs: There is no murmur.      No gallop.  No click. No rub.       No audible carotid bruit     No lower extremity edema on exam  Pulmonary:      Effort: Pulmonary effort is normal.      Breath sounds: Normal breath sounds.   Abdominal:      General: Abdomen is flat. Bowel sounds are normal.      Palpations: Abdomen is soft and non-tender  Musculoskeletal:      Cervical back: Normal range of motion and neck supple.      Shoulder, right: Limited ROM  Skin:     General: Skin is warm and dry.      Capillary Refill: Capillary refill takes less than 2 seconds.   Neurological:      General: No focal deficit present.      Mental Status: He is alert and oriented to person,  "place, and time. Mental status is at baseline.   Psychiatric:         Mood and Affect: Mood normal.         Behavior: Behavior normal.         Thought Content: Thought content normal.         Judgment: Judgment normal.     Vitals and nursing note reviewed. Physical exam within normal limits.     Visit Vitals  /86   Pulse 72   Temp 36.2 °C (97.2 °F) (Temporal)   Resp 16   Ht 1.905 m (6' 3\")   Wt 98.3 kg (216 lb 11.4 oz)   SpO2 100%   BMI 27.09 kg/m²   Smoking Status Never   BSA 2.28 m²      DASI Risk Score      Flowsheet Row Pre-Admission Testing from 11/29/2024 in [facility]   Can you take care of yourself (eat, dress, bathe, or use toilet)?  2.75 filed at 11/29/2024 1649   Can you walk indoors, such as around your house? 1.75 filed at 11/29/2024 1649   Can you walk a block or two on level ground?  2.75 filed at 11/29/2024 1649   Can you climb a flight of stairs or walk up a hill? 5.5 filed at 11/29/2024 1649   Can you run a short distance? 8 filed at 11/29/2024 1649   Can you do light work around the house like dusting or washing dishes? 2.7 filed at 11/29/2024 1649   Can you do moderate work around the house like vacuuming, sweeping floors or carrying groceries? 3.5 filed at 11/29/2024 1649   Can you do heavy work around the house like scrubbing floors or lifting and moving heavy furniture?  8 filed at 11/29/2024 1649   Can you do yard work like raking leaves, weeding or pushing a mower? 4.5 filed at 11/29/2024 1649   Can you have sexual relations? 5.25 filed at 11/29/2024 1649   Can you participate in moderate recreational activities like golf, bowling, dancing, doubles tennis or throwing a baseball or football? 6 filed at 11/29/2024 1649   Can you participate in strenous sports like swimming, singles tennis, football, basketball, or skiing? 7.5 filed at 11/29/2024 1649   DASI SCORE 58.2 filed at 11/29/2024 1649   METS Score (Will be calculated only when all the questions are answered) 9.9 " filed at 11/29/2024 1649          Capmarciei DVT Assessment      Flowsheet Row Pre-Admission Testing from 11/29/2024 in [facility]   DVT Score 4 filed at 11/29/2024 1649   Surgical Factors Major surgery planned, including arthroscopic and laproscopic (1-2 hours) filed at 11/29/2024 1649   BMI 30 or less filed at 11/29/2024 1649          Modified Frailty Index      Flowsheet Row Pre-Admission Testing from 11/29/2024 in [facility]   Non-independent functional status (problems with dressing, bathing, personal grooming, or cooking) 0 filed at 11/29/2024 1649   History of diabetes mellitus  0 filed at 11/29/2024 1649   History of COPD 0 filed at 11/29/2024 1649   History of CHF No filed at 11/29/2024 1649   History of MI 0 filed at 11/29/2024 1649   History of Percutaneous Coronary Intervention, Cardiac Surgery, or Angina No filed at 11/29/2024 1649   Hypertension requiring the use of medication  0 filed at 11/29/2024 1649   Peripheral vascular disease 0 filed at 11/29/2024 1649   Impaired sensorium (cognitive impairement or loss, clouding, or delirium) 0 filed at 11/29/2024 1649   TIA or CVA withouy residual deficit 0 filed at 11/29/2024 1649   Cerebrovascular accident with deficit 0 filed at 11/29/2024 1649   Modified Frailty Index Calculator 0 filed at 11/29/2024 1649          CHADS2 Stroke Risk  Current as of 6 minutes ago        N/A 3 to 100%: High Risk   2 to < 3%: Medium Risk   0 to < 2%: Low Risk     Last Change: N/A          This score determines the patient's risk of having a stroke if the patient has atrial fibrillation.        This score is not applicable to this patient. Components are not calculated.          Revised Cardiac Risk Index      Flowsheet Row Pre-Admission Testing from 11/29/2024 in [facility]   High-Risk Surgery (Intraperitoneal, Intrathoracic,Suprainguinal vascular) 0 filed at 11/29/2024 1649   History of ischemic heart disease (History of MI,  History of positive exercuse test, Current chest paint considered due to myocardial ischemia, Use of nitrate therapy, ECG with pathological Q Waves) 0 filed at 11/29/2024 1649   History of congestive heart failure (pulmonary edemia, bilateral rales or S3 gallop, Paroxysmal nocturnal dyspnea, CXR showing pulmonary vascular redistribution) 0 filed at 11/29/2024 1649   History of cerebrovascular disease (Prior TIA or stroke) 0 filed at 11/29/2024 1649   Pre-operative insulin treatment 0 filed at 11/29/2024 1649   Pre-operative creatinine>2 mg/dl 0 filed at 11/29/2024 1649   Revised Cardiac Risk Calculator 0 filed at 11/29/2024 1649          Apfel Simplified Score    No data to display       Risk Analysis Index Results This Encounter    No data found in the last 10 encounters.       Stop Bang Score      Flowsheet Row Pre-Admission Testing from 11/29/2024 in [facility]   Do you snore loudly? 0 filed at 11/29/2024 1632   Do you often feel tired or fatigued after your sleep? 0 filed at 11/29/2024 1632   Has anyone ever observed you stop breathing in your sleep? 0 filed at 11/29/2024 1632   Do you have or are you being treated for high blood pressure? 0 filed at 11/29/2024 1632   Recent BMI (Calculated) 26.5 filed at 11/29/2024 1632   Is BMI greater than 35 kg/m2? 0=No filed at 11/29/2024 1632   Age older than 50 years old? 1=Yes filed at 11/29/2024 1632   Is your neck circumference greater than 17 inches (Male) or 16 inches (Female)? 0 filed at 11/29/2024 1632   Gender - Male 1=Yes filed at 11/29/2024 1632   STOP-BANG Total Score 2 filed at 11/29/2024 1632          Prodigy: High Risk  Total Score: 8              Prodigy Gender Score          ARISCAT Score for Postoperative Pulmonary Complications      Flowsheet Row Pre-Admission Testing from 11/29/2024 in [facility]   Age, years  3 filed at 11/29/2024 1649   Preoperative SpO2 0 filed at 11/29/2024 1649   Respiratory infection in the last  month Either upper or lower (i.e., URI, bronchitis, pneumonia), with fever and antibiotic treatment 0 filed at 11/29/2024 1649   Preoperative anemoa (Hgb less than 10 g/dl) 0 filed at 11/29/2024 1649   Surgical incision  0 filed at 11/29/2024 1649   Duration of surgery  0 filed at 11/29/2024 1649   Emergency Procedure  0 filed at 11/29/2024 1649   ARISCAT Total Score  3 filed at 11/29/2024 1649          Maninder Perioperative Risk for Myocardial Infarction or Cardiac Arrest (ADALI)      Flowsheet Row Pre-Admission Testing from 11/29/2024 in [facility]   Age 1.12 filed at 11/29/2024 1650   Functional Status  0 filed at 11/29/2024 1650   ASA Class  -5.17 filed at 11/29/2024 1650   Creatinine 0 filed at 11/29/2024 1650   Type of Procedure  0.80 filed at 11/29/2024 1650   ADALI Total Score  -8.5 filed at 11/29/2024 1650   ADALI % 0.02 filed at 11/29/2024 1650          Assessment & Plan:    Neuro:  No diagnosis or significant findings on chart review or clinical presentation and evaluation.     HEENT/Airway:  No diagnosis or significant findings on chart review or clinical presentation and evaluation.   STOP-BANG Score-2 points low risk for BRENDA    Mallampati::  II    TM distance::  >3 FB    Neck ROM::  Full  Dentures-denies  Crowns-denies  Implants-denies    Cardiovascular:  No diagnosis or significant findings on chart review or clinical presentation and evaluation.   METS: 9.9  RCRI: 0 points, 3.9%  risk for postoperative MACE   ADALI: 0.02% risk for postoperative MACE    Pulmonary:  No diagnosis or significant findings on chart review or clinical presentation and evaluation.   ARISCAT: <26 points, 1.6% risk of in-hospital postoperative pulmonary complication  PRODIGY: Low risk for opioid induced respiratory depression  Smoking History-He has never smoked.  Discussed smoking cessation and deep breathing handout given    Renal/Urinary:  No diagnosis or significant findings on chart review or clinical  presentation and evaluation, however, the patient is at increased risk of perioperative renal complications secondary to age>/= 54 and male sex. Preventative measures include BP monitoring, medication compliance, and hydration management.   CMP-Reviewed, stable  Creatinine-0.93  GFR->90    Endocrine:  No diagnosis or significant findings on chart review or clinical presentation and evaluation.     Hematologic/Immunology:  No diagnosis or significant findings on chart review or clinical presentation and evaluation.  The patient is not a Jehovah’s witness and will accept blood and blood products if medically indicated.   History of previous blood transfusions No  CBC-Reviewed, stable  HGB-14.3  Caprini Score 4, patient at Moderate for postoperative DVT. Pt supplied education/VTE handout  Anticoagulation use: No     Gastrointestinal:   No diagnosis or significant findings on chart review or clinical presentation and evaluation.   Recreational drug use: Drug use No  Alcohol use social drinker    Infectious disease:   No diagnosis or significant findings on chart review or clinical presentation and evaluation.     Musculoskeletal:   No diagnosis or significant findings on chart review or clinical presentation and evaluation.   JHFRAT score-0 points. low risk for falls    Anesthesia:  ASA 1 - Normal health patient  Anticipated anesthesia-Regional  History of General anesthesia- yes  Complications- No anesthesia complications  No family history of anesthesia complications    Abnormalities noted on PAT evaluation: No    Labs & Imaging ordered:  CBC, CMP  Nickel/metal allergy-negative  Shellfish allergy-negative    Overall, patient Low Risk for the scheduled Moderate Risk surgery. Discussed with patient medication instructions, NPO guidelines, and any questions or concerns.     Face to face time-25 minutes

## 2024-11-29 NOTE — PREPROCEDURE INSTRUCTIONS
Medication List      as of November 29, 2024  4:41 PM     You have not been prescribed any medications.       NPO Instructions:     Do not eat any food after midnight the night before your surgery/procedure.  You may have clear liquids until TWO hours before surgery/procedure. This includes water, black tea/coffee, (no milk or cream) apple juice and electrolyte drinks (Gatorade).  You may chew gum up to TWO hours before your surgery/procedure.     Additional Instructions:      Seven/Six Days before Surgery:  Review your medication instructions, stop indicated medications  Five Days before Surgery:  Review your medication instructions, stop indicated medications  Three Days before Surgery:  Review your medication instructions, stop indicated medications  The Day before Surgery:  No smoking or alcohol use 24 hours before surgery  Review your medication instructions, stop indicated medications  You will be contacted regarding the time of your arrival to facility and surgery time  Do not eat any food after Midnight  Day of Surgery:  Review your medication instructions, take indicated medications  If you have diabetes, please check your fasting blood sugar upon awakening.  If fasting blood sugar is <80 mg/dl, drink 100 ml of apple juice, time limit of 2 hours before  You may have clear liquids until TWO hours before surgery/procedure.  This includes water, black tea/coffee, (no milk or cream) apple juice and electrolyte drinks (Gatorade)  You may chew gum up to TWO hours before your surgery/procedure  Wear  comfortable loose fitting clothing  Do not use moisturizers, creams, lotions or perfume  All jewelry and valuables should be left at home     CONTACT SURGEON'S OFFICE IF YOU DEVELOP:  * Fever = 100.4 F   * New respiratory symptoms (e.g. cough, shortness of breath, respiratory distress, sore throat)  * Recent loss of taste or smell  *Flu like symptoms such as headache, fatigue or gastrointestinal symptoms  * You  develop any open sores, shingles, burning or painful urination   AND/OR:  * You no longer wish to have the surgery.  * Any other personal circumstances change that may lead to the need to cancel or defer this surgery.  *You were admitted to any hospital within one week of your planned procedure.     SMOKING:  *Quitting smoking can make a huge difference to your health and recovery from surgery.    *If you need help with quitting, call 3-304-QUIT-NOW.     THE DAY BEFORE SURGERY:  *Do not eat any food after midnight the night before your surgery.   *You may have up to TEN OUNCES of clear liquids until TWO hours before your instructed ARRIVAL TIME to hospital. This includes water, black tea/coffee, (no milk or cream) apple juice, clear broth and electrolyte drinks (Gatorade). Please avoid clear liquids that are red in color.   *You may chew gum/mints up to TWO hours before your surgery/procedure.     SURGICAL TIME:  *You will be contacted between 2 p.m. and 3 p.m. the business day before your surgery with your arrival time.  *If you haven't received a call by 3pm, call [phone]  *Scheduled surgery times may change and you will be notified if this occurs-check your personal voicemail for any updates.     ON THE MORNING OF SURGERY:  *Wear comfortable, loose fitting clothing.   *Do not use moisturizers, creams, lotions or perfume.  *All jewelry and valuables should be left at home.  *Prosthetic devices such as contact lenses, hearing aids, dentures, eyelash extensions, hairpins and body piercing must be removed before surgery.     BRING WITH YOU:  *Photo ID and insurance card  *Current list of medications and allergies  *Pacemaker/Defibrillator/Heart stent cards  *CPAP machine and mask  *Slings/splints/crutches  *Copy of your complete Advanced Directive/DHPOA-if applicable  *Neurostimulator implant remote     PARKING AND ARRIVAL:  *Check in at the Main Entrance desk and let them know you are here for surgery.     IF  YOU ARE HAVING OUTPATIENT/SAME DAY SURGERY:  *A responsible adult MUST accompany you at the time of discharge and stay with you for 24 hours after your surgery.  *You may NOT drive yourself home after surgery.  *You may use a taxi or ride sharing service ([employer], [employer]) to return home ONLY if you are accompanied by a friend or family member.  *Instructions for resuming your medications will be provided by your surgeon.     Thank you for coming to Pre Admission testing.      If I have prescribed medication please don't forget to  at your pharmacy.      Any questions about today's visit call [phone] and leave a message in the general mailbox.     Patient instructed to ambulate as soon as possible postoperatively to decrease thromboembolic risk.     Arthur Rutherford, APRN-CNP     Thank you for visiting [facility].  If you have any changes to your health condition, please call the surgeons office to alert them and give them details of your symptoms.        Preoperative Fasting Guidelines     Why must I stop eating and drinking near surgery time?  With sedation, food or liquid in your stomach can enter your lungs causing serious complications  Increases nausea and vomiting     When do I need to stop eating and drinking before my surgery?  Do not eat any food after midnight the night before your surgery/procedure.  You may have up to TEN ounces of clear liquid until TWO hours before your instructed arrival time to the hospital.  This includes water, black tea/coffee, (no milk or cream) apple juice, and electrolyte drinks (Gatorade)  You may chew gum until TWO hours before your surgery/procedure        Additional Instructions:      The Day before Surgery:  -Review your medication instructions, stop indicated medications  -You will be contacted in the evening regarding the time of your arrival to facility and surgery time     Day of Surgery:  -Review your medication instructions, take  indicated medications  -Wear comfortable loose fitting clothing  -Do not use moisturizers, creams, lotions or perfume  -All jewelry and valuables should be left at home                   Preoperative Brain Exercises     What are brain exercises?  A brain exercise is any activity that engages your thinking (cognitive) skills.     What types of activities are considered brain exercises?  Jigsaw puzzles, crossword puzzles, word jumble, memory games, word search, and many more.  Many can be found free online or on your phone via a mobile jose.     Why should I do brain exercises before my surgery?  More recent research has shown brain exercise before surgery can lower the risk of postoperative delirium (confusion) which can be especially important for older adults.  Patients who did brain exercises for 5 to 10 hours the days before surgery, cut their risk of postoperative delirium in half up to 1 week after surgery.                         [facility]     Preoperative Deep Breathing Exercises     Why it is important to do deep breathing exercises before my surgery?  Deep breathing exercises strengthen your breathing muscles.  This helps you to recover after your surgery and decreases the chance of breathing complications.        How are the deep breathing exercises done?  Sit straight with your back supported.  Breathe in deeply and slowly through your nose. Your lower rib cage should expand and your abdomen may move forward.  Hold that breath for 3 to 5 seconds.  Breathe out through pursed lips, slowly and completely.  Rest and repeat 10 times every hour while awake.  Rest longer if you become dizzy or lightheaded.                      [facility]     Preoperative Deep Breathing Exercises     Why it is important to do deep breathing exercises before my surgery?  Deep breathing exercises strengthen your breathing muscles.  This helps you to recover after your surgery  and decreases the chance of breathing complications.        How are the deep breathing exercises done?  Sit straight with your back supported.  Breathe in deeply and slowly through your nose. Your lower rib cage should expand and your abdomen may move forward.  Hold that breath for 3 to 5 seconds.  Breathe out through pursed lips, slowly and completely.  Rest and repeat 10 times every hour while awake.  Rest longer if you become dizzy or lightheaded.        Patient Information: Incentive Spirometer  What is an incentive spirometer?  An incentive spirometer is a device used before and after surgery to “exercise” your lungs.  It helps you to take deeper breaths to expand your lungs.  Below is an example of a basic incentive spirometer.  The device you receive may differ slightly but they all function the same.    Why do I need to use an incentive spirometer?  Using your incentive spirometer prepares your lungs for surgery and helps prevent lung problems after surgery.  How do I use my incentive spirometer?  When you're using your incentive spirometer, make sure to breathe through your mouth. If you breathe through your nose, the incentive spirometer won't work properly. You can hold your nose if you have trouble.  If you feel dizzy at any time, stop and rest. Try again at a later time.  Follow the steps below:  Set up your incentive spirometer, expand the flexible tubing and connect to the outlet.  Sit upright in a chair or bed. Hold the incentive spirometer at eye level.   Put the mouthpiece in your mouth and close your lips tightly around it. Slowly breathe out (exhale) completely.  Breathe in (inhale) slowly through your mouth as deeply as you can. As you take a breath, you will see the piston rise inside the large column. While the piston rises, the indicator should move upwards. It should stay in between the 2 arrows (see Figure).  Try to get the piston as high as you can, while keeping the indicator between the  arrows.   If the indicator doesn't stay between the arrows, you're breathing either too fast or too slow.  When you get it as high as you can, hold your breath for 10 seconds, or as long as possible. While you're holding your breath, the piston will slowly fall to the base of the spirometer.  Once the piston reaches the bottom of the spirometer, breathe out slowly through your mouth. Rest for a few seconds.  Repeat 10 times. Try to get the piston to the same level with each breath.  Repeat every hour while awake  You can carefully clean the outside of the mouthpiece with an alcohol wipe or soap and water.       Patient and Family Education             Ways You Can Help Prevent Blood Clots                    This handout explains some simple things you can do to help prevent blood clots.      Blood clots are blockages that can form in the body's veins. When a blood clot forms in your deep veins, it may be called a deep vein thrombosis, or DVT for short. Blood clots can happen in any part of the body where blood flows, but they are most common in the arms and legs. If a piece of a blood clot breaks free and travels to the lungs, it is called a pulmonary embolus (PE). A PE can be a very serious problem.         Being in the hospital or having surgery can raise your chances of getting a blood clot because you may not be well enough to move around as much as you normally do.         Ways you can help prevent blood clots in the hospital           Wearing SCDs. SCDs stands for Sequential Compression Devices.   SCDs are special sleeves that wrap around your legs  They attach to a pump that fills them with air to gently squeeze your legs every few minutes.   This helps return the blood in your legs to your heart.   SCDs should only be taken off when walking or bathing.   SCDs may not be comfortable, but they can help save your life.                                            Wearing compression stockings - if your doctor  orders them. These special snug fitting stockings gently squeeze your legs to help blood flow.       Walking. Walking helps move the blood in your legs.   If your doctor says it is ok, try walking the halls at least   5 times a day. Ask us to help you get up, so you don't fall.      Taking any blood thinning medicines your doctor orders.        Page 1 of 2            [facility]; 3/23   Ways you can help prevent blood clots at home         Wearing compression stockings - if your doctor orders them. ? Walking - to help move the blood in your legs.       Taking any blood thinning medicines your doctor orders.      Signs of a blood clot or PE        Tell your doctor or nurse know right away if you have of the problems listed below.    If you are at home, seek medical care right away. Call 911 for chest pain or problems breathing.                Signs of a blood clot (DVT) - such as pain,  swelling, redness or warmth in your arm or leg      Signs of a pulmonary embolism (PE) - such as chest pain or feeling short of breath

## 2024-12-03 ENCOUNTER — HOSPITAL ENCOUNTER (OUTPATIENT)
Facility: HOSPITAL | Age: 54
Setting detail: OUTPATIENT SURGERY
Discharge: HOME | End: 2024-12-03
Attending: STUDENT IN AN ORGANIZED HEALTH CARE EDUCATION/TRAINING PROGRAM | Admitting: STUDENT IN AN ORGANIZED HEALTH CARE EDUCATION/TRAINING PROGRAM
Payer: COMMERCIAL

## 2024-12-03 ENCOUNTER — ANESTHESIA EVENT (OUTPATIENT)
Dept: OPERATING ROOM | Facility: HOSPITAL | Age: 54
End: 2024-12-03
Payer: COMMERCIAL

## 2024-12-03 ENCOUNTER — ANESTHESIA (OUTPATIENT)
Dept: OPERATING ROOM | Facility: HOSPITAL | Age: 54
End: 2024-12-03
Payer: COMMERCIAL

## 2024-12-03 VITALS
WEIGHT: 216.27 LBS | OXYGEN SATURATION: 95 % | TEMPERATURE: 97.2 F | RESPIRATION RATE: 18 BRPM | HEIGHT: 75 IN | HEART RATE: 56 BPM | BODY MASS INDEX: 26.89 KG/M2 | DIASTOLIC BLOOD PRESSURE: 76 MMHG | SYSTOLIC BLOOD PRESSURE: 123 MMHG

## 2024-12-03 DIAGNOSIS — M25.811 SHOULDER IMPINGEMENT, RIGHT: ICD-10-CM

## 2024-12-03 DIAGNOSIS — M65.911 SYNOVITIS OF RIGHT SHOULDER: Primary | ICD-10-CM

## 2024-12-03 DIAGNOSIS — S46.011A TRAUMATIC COMPLETE TEAR OF RIGHT ROTATOR CUFF, INITIAL ENCOUNTER: ICD-10-CM

## 2024-12-03 PROBLEM — C96.9 HEMATOLOGIC MALIGNANCY (MULTI): Status: ACTIVE | Noted: 2024-12-03

## 2024-12-03 PROCEDURE — 7100000010 HC PHASE TWO TIME - EACH INCREMENTAL 1 MINUTE: Performed by: STUDENT IN AN ORGANIZED HEALTH CARE EDUCATION/TRAINING PROGRAM

## 2024-12-03 PROCEDURE — 29826 SHO ARTHRS SRG DECOMPRESSION: CPT | Performed by: STUDENT IN AN ORGANIZED HEALTH CARE EDUCATION/TRAINING PROGRAM

## 2024-12-03 PROCEDURE — 2500000004 HC RX 250 GENERAL PHARMACY W/ HCPCS (ALT 636 FOR OP/ED)

## 2024-12-03 PROCEDURE — 64415 NJX AA&/STRD BRCH PLXS IMG: CPT | Performed by: STUDENT IN AN ORGANIZED HEALTH CARE EDUCATION/TRAINING PROGRAM

## 2024-12-03 PROCEDURE — 2780000003 HC OR 278 NO HCPCS: Performed by: STUDENT IN AN ORGANIZED HEALTH CARE EDUCATION/TRAINING PROGRAM

## 2024-12-03 PROCEDURE — A29827 PR SHLDR ARTHROSCOP,SURG,W/ROTAT CUFF REPR: Performed by: NURSE ANESTHETIST, CERTIFIED REGISTERED

## 2024-12-03 PROCEDURE — 2500000005 HC RX 250 GENERAL PHARMACY W/O HCPCS: Performed by: NURSE ANESTHETIST, CERTIFIED REGISTERED

## 2024-12-03 PROCEDURE — 7100000002 HC RECOVERY ROOM TIME - EACH INCREMENTAL 1 MINUTE: Performed by: STUDENT IN AN ORGANIZED HEALTH CARE EDUCATION/TRAINING PROGRAM

## 2024-12-03 PROCEDURE — 3600000009 HC OR TIME - EACH INCREMENTAL 1 MINUTE - PROCEDURE LEVEL FOUR: Performed by: STUDENT IN AN ORGANIZED HEALTH CARE EDUCATION/TRAINING PROGRAM

## 2024-12-03 PROCEDURE — 29827 SHO ARTHRS SRG RT8TR CUF RPR: CPT

## 2024-12-03 PROCEDURE — 29823 SHO ARTHRS SRG XTNSV DBRDMT: CPT

## 2024-12-03 PROCEDURE — A29827 PR SHLDR ARTHROSCOP,SURG,W/ROTAT CUFF REPR: Performed by: STUDENT IN AN ORGANIZED HEALTH CARE EDUCATION/TRAINING PROGRAM

## 2024-12-03 PROCEDURE — 7100000001 HC RECOVERY ROOM TIME - INITIAL BASE CHARGE: Performed by: STUDENT IN AN ORGANIZED HEALTH CARE EDUCATION/TRAINING PROGRAM

## 2024-12-03 PROCEDURE — 3600000004 HC OR TIME - INITIAL BASE CHARGE - PROCEDURE LEVEL FOUR: Performed by: STUDENT IN AN ORGANIZED HEALTH CARE EDUCATION/TRAINING PROGRAM

## 2024-12-03 PROCEDURE — 2720000007 HC OR 272 NO HCPCS: Performed by: STUDENT IN AN ORGANIZED HEALTH CARE EDUCATION/TRAINING PROGRAM

## 2024-12-03 PROCEDURE — 7100000009 HC PHASE TWO TIME - INITIAL BASE CHARGE: Performed by: STUDENT IN AN ORGANIZED HEALTH CARE EDUCATION/TRAINING PROGRAM

## 2024-12-03 PROCEDURE — C1713 ANCHOR/SCREW BN/BN,TIS/BN: HCPCS | Performed by: STUDENT IN AN ORGANIZED HEALTH CARE EDUCATION/TRAINING PROGRAM

## 2024-12-03 PROCEDURE — 2500000005 HC RX 250 GENERAL PHARMACY W/O HCPCS: Performed by: STUDENT IN AN ORGANIZED HEALTH CARE EDUCATION/TRAINING PROGRAM

## 2024-12-03 PROCEDURE — 2500000004 HC RX 250 GENERAL PHARMACY W/ HCPCS (ALT 636 FOR OP/ED): Performed by: NURSE ANESTHETIST, CERTIFIED REGISTERED

## 2024-12-03 PROCEDURE — 3700000001 HC GENERAL ANESTHESIA TIME - INITIAL BASE CHARGE: Performed by: STUDENT IN AN ORGANIZED HEALTH CARE EDUCATION/TRAINING PROGRAM

## 2024-12-03 PROCEDURE — 29823 SHO ARTHRS SRG XTNSV DBRDMT: CPT | Performed by: STUDENT IN AN ORGANIZED HEALTH CARE EDUCATION/TRAINING PROGRAM

## 2024-12-03 PROCEDURE — 3700000002 HC GENERAL ANESTHESIA TIME - EACH INCREMENTAL 1 MINUTE: Performed by: STUDENT IN AN ORGANIZED HEALTH CARE EDUCATION/TRAINING PROGRAM

## 2024-12-03 PROCEDURE — 2500000004 HC RX 250 GENERAL PHARMACY W/ HCPCS (ALT 636 FOR OP/ED): Performed by: STUDENT IN AN ORGANIZED HEALTH CARE EDUCATION/TRAINING PROGRAM

## 2024-12-03 PROCEDURE — 29826 SHO ARTHRS SRG DECOMPRESSION: CPT

## 2024-12-03 PROCEDURE — 29827 SHO ARTHRS SRG RT8TR CUF RPR: CPT | Performed by: STUDENT IN AN ORGANIZED HEALTH CARE EDUCATION/TRAINING PROGRAM

## 2024-12-03 PROCEDURE — 2500000005 HC RX 250 GENERAL PHARMACY W/O HCPCS

## 2024-12-03 PROCEDURE — C1781 MESH (IMPLANTABLE): HCPCS | Performed by: STUDENT IN AN ORGANIZED HEALTH CARE EDUCATION/TRAINING PROGRAM

## 2024-12-03 PROCEDURE — 2500000004 HC RX 250 GENERAL PHARMACY W/ HCPCS (ALT 636 FOR OP/ED): Mod: JZ

## 2024-12-03 PROCEDURE — 2500000001 HC RX 250 WO HCPCS SELF ADMINISTERED DRUGS (ALT 637 FOR MEDICARE OP)

## 2024-12-03 PROCEDURE — A4649 SURGICAL SUPPLIES: HCPCS | Performed by: STUDENT IN AN ORGANIZED HEALTH CARE EDUCATION/TRAINING PROGRAM

## 2024-12-03 DEVICE — IMPLANTABLE DEVICE: Type: IMPLANTABLE DEVICE | Site: SHOULDER | Status: NON-FUNCTIONAL

## 2024-12-03 DEVICE — IMPLANTABLE DEVICE
Type: IMPLANTABLE DEVICE | Site: SHOULDER | Status: FUNCTIONAL
Brand: BIOINDUCTIVE IMPLANT WITH ARTHROSCOPIC DELIVERY SYSTEM - MEDIUM

## 2024-12-03 DEVICE — IMPLANTABLE DEVICE
Type: IMPLANTABLE DEVICE | Site: SHOULDER | Status: FUNCTIONAL
Brand: ROTATION MEDICAL TENDON STAPLES

## 2024-12-03 DEVICE — BONE ANCHORS 3 WITH ARTHROSCOPIC DELIVERY SYSTEM ADVANCED
Type: IMPLANTABLE DEVICE | Site: SHOULDER | Status: FUNCTIONAL
Brand: BONE ANCHORS WITH ARTHROSCOPIC DELIVERY SYSTEM - ADVANCED

## 2024-12-03 DEVICE — ANCHOR, SWIVEL LOCK, BIOMPOSITE, KNOTLESS, 4.75MM X 19.1MM, W/P2 SUTURE BLUE: Type: IMPLANTABLE DEVICE | Site: SHOULDER | Status: FUNCTIONAL

## 2024-12-03 RX ORDER — NORETHINDRONE AND ETHINYL ESTRADIOL 0.5-0.035
KIT ORAL AS NEEDED
Status: DISCONTINUED | OUTPATIENT
Start: 2024-12-03 | End: 2024-12-03

## 2024-12-03 RX ORDER — SODIUM CHLORIDE, SODIUM LACTATE, POTASSIUM CHLORIDE, CALCIUM CHLORIDE 600; 310; 30; 20 MG/100ML; MG/100ML; MG/100ML; MG/100ML
100 INJECTION, SOLUTION INTRAVENOUS CONTINUOUS
Status: DISCONTINUED | OUTPATIENT
Start: 2024-12-03 | End: 2024-12-03 | Stop reason: HOSPADM

## 2024-12-03 RX ORDER — FENTANYL CITRATE 50 UG/ML
INJECTION, SOLUTION INTRAMUSCULAR; INTRAVENOUS AS NEEDED
Status: DISCONTINUED | OUTPATIENT
Start: 2024-12-03 | End: 2024-12-03

## 2024-12-03 RX ORDER — FENTANYL CITRATE 50 UG/ML
50 INJECTION, SOLUTION INTRAMUSCULAR; INTRAVENOUS ONCE
Status: COMPLETED | OUTPATIENT
Start: 2024-12-03 | End: 2024-12-03

## 2024-12-03 RX ORDER — CELECOXIB 200 MG/1
200 CAPSULE ORAL ONCE
Status: COMPLETED | OUTPATIENT
Start: 2024-12-03 | End: 2024-12-03

## 2024-12-03 RX ORDER — PROPOFOL 10 MG/ML
INJECTION, EMULSION INTRAVENOUS AS NEEDED
Status: DISCONTINUED | OUTPATIENT
Start: 2024-12-03 | End: 2024-12-03

## 2024-12-03 RX ORDER — HYDRALAZINE HYDROCHLORIDE 20 MG/ML
5 INJECTION INTRAMUSCULAR; INTRAVENOUS EVERY 30 MIN PRN
Status: DISCONTINUED | OUTPATIENT
Start: 2024-12-03 | End: 2024-12-03 | Stop reason: HOSPADM

## 2024-12-03 RX ORDER — MIDAZOLAM HYDROCHLORIDE 1 MG/ML
2 INJECTION, SOLUTION INTRAMUSCULAR; INTRAVENOUS ONCE
Status: COMPLETED | OUTPATIENT
Start: 2024-12-03 | End: 2024-12-03

## 2024-12-03 RX ORDER — ACETAMINOPHEN 500 MG
1000 TABLET ORAL EVERY 8 HOURS PRN
Qty: 60 TABLET | Refills: 1 | Status: SHIPPED | OUTPATIENT
Start: 2024-12-03

## 2024-12-03 RX ORDER — FENTANYL CITRATE 50 UG/ML
50 INJECTION, SOLUTION INTRAMUSCULAR; INTRAVENOUS EVERY 5 MIN PRN
Status: DISCONTINUED | OUTPATIENT
Start: 2024-12-03 | End: 2024-12-03 | Stop reason: HOSPADM

## 2024-12-03 RX ORDER — ALBUTEROL SULFATE 0.83 MG/ML
2.5 SOLUTION RESPIRATORY (INHALATION) ONCE AS NEEDED
Status: DISCONTINUED | OUTPATIENT
Start: 2024-12-03 | End: 2024-12-03 | Stop reason: HOSPADM

## 2024-12-03 RX ORDER — FENTANYL CITRATE 50 UG/ML
25 INJECTION, SOLUTION INTRAMUSCULAR; INTRAVENOUS EVERY 5 MIN PRN
Status: DISCONTINUED | OUTPATIENT
Start: 2024-12-03 | End: 2024-12-03 | Stop reason: HOSPADM

## 2024-12-03 RX ORDER — SODIUM CHLORIDE, SODIUM LACTATE, POTASSIUM CHLORIDE, CALCIUM CHLORIDE 600; 310; 30; 20 MG/100ML; MG/100ML; MG/100ML; MG/100ML
INJECTION, SOLUTION INTRAVENOUS CONTINUOUS PRN
Status: DISCONTINUED | OUTPATIENT
Start: 2024-12-03 | End: 2024-12-03

## 2024-12-03 RX ORDER — ONDANSETRON 4 MG/1
4 TABLET, FILM COATED ORAL EVERY 8 HOURS PRN
Qty: 20 TABLET | Refills: 0 | Status: SHIPPED | OUTPATIENT
Start: 2024-12-03 | End: 2024-12-03

## 2024-12-03 RX ORDER — PROCHLORPERAZINE EDISYLATE 5 MG/ML
5 INJECTION INTRAMUSCULAR; INTRAVENOUS ONCE AS NEEDED
Status: DISCONTINUED | OUTPATIENT
Start: 2024-12-03 | End: 2024-12-03 | Stop reason: HOSPADM

## 2024-12-03 RX ORDER — ONDANSETRON HYDROCHLORIDE 2 MG/ML
INJECTION, SOLUTION INTRAVENOUS AS NEEDED
Status: DISCONTINUED | OUTPATIENT
Start: 2024-12-03 | End: 2024-12-03

## 2024-12-03 RX ORDER — LIDOCAINE HYDROCHLORIDE 10 MG/ML
INJECTION, SOLUTION INFILTRATION; PERINEURAL AS NEEDED
Status: DISCONTINUED | OUTPATIENT
Start: 2024-12-03 | End: 2024-12-03

## 2024-12-03 RX ORDER — IPRATROPIUM BROMIDE 0.5 MG/2.5ML
500 SOLUTION RESPIRATORY (INHALATION) AS NEEDED
Status: DISCONTINUED | OUTPATIENT
Start: 2024-12-03 | End: 2024-12-03 | Stop reason: HOSPADM

## 2024-12-03 RX ORDER — ACETAMINOPHEN 500 MG
1000 TABLET ORAL EVERY 8 HOURS PRN
Qty: 60 TABLET | Refills: 1 | Status: SHIPPED | OUTPATIENT
Start: 2024-12-03 | End: 2024-12-03

## 2024-12-03 RX ORDER — KETOROLAC TROMETHAMINE 30 MG/ML
INJECTION, SOLUTION INTRAMUSCULAR; INTRAVENOUS AS NEEDED
Status: DISCONTINUED | OUTPATIENT
Start: 2024-12-03 | End: 2024-12-03

## 2024-12-03 RX ORDER — OXYCODONE HYDROCHLORIDE 5 MG/1
5 TABLET ORAL EVERY 4 HOURS PRN
Qty: 15 TABLET | Refills: 0 | Status: SHIPPED | OUTPATIENT
Start: 2024-12-03

## 2024-12-03 RX ORDER — LABETALOL HYDROCHLORIDE 5 MG/ML
5 INJECTION, SOLUTION INTRAVENOUS ONCE AS NEEDED
Status: DISCONTINUED | OUTPATIENT
Start: 2024-12-03 | End: 2024-12-03 | Stop reason: HOSPADM

## 2024-12-03 RX ORDER — ASPIRIN 81 MG/1
81 TABLET ORAL 2 TIMES DAILY
Qty: 15 TABLET | Refills: 0 | Status: SHIPPED | OUTPATIENT
Start: 2024-12-03

## 2024-12-03 RX ORDER — CEFAZOLIN SODIUM 2 G/100ML
2 INJECTION, SOLUTION INTRAVENOUS ONCE
Status: COMPLETED | OUTPATIENT
Start: 2024-12-03 | End: 2024-12-03

## 2024-12-03 RX ORDER — ACETAMINOPHEN 325 MG/1
975 TABLET ORAL ONCE
Status: COMPLETED | OUTPATIENT
Start: 2024-12-03 | End: 2024-12-03

## 2024-12-03 RX ORDER — ONDANSETRON 4 MG/1
4 TABLET, FILM COATED ORAL EVERY 8 HOURS PRN
Qty: 20 TABLET | Refills: 0 | Status: SHIPPED | OUTPATIENT
Start: 2024-12-03

## 2024-12-03 RX ORDER — BUPIVACAINE HYDROCHLORIDE 5 MG/ML
INJECTION, SOLUTION PERINEURAL AS NEEDED
Status: DISCONTINUED | OUTPATIENT
Start: 2024-12-03 | End: 2024-12-03 | Stop reason: HOSPADM

## 2024-12-03 RX ORDER — MEPERIDINE HYDROCHLORIDE 25 MG/ML
12.5 INJECTION INTRAMUSCULAR; INTRAVENOUS; SUBCUTANEOUS EVERY 10 MIN PRN
Status: DISCONTINUED | OUTPATIENT
Start: 2024-12-03 | End: 2024-12-03 | Stop reason: HOSPADM

## 2024-12-03 RX ORDER — ONDANSETRON HYDROCHLORIDE 2 MG/ML
4 INJECTION, SOLUTION INTRAVENOUS ONCE AS NEEDED
Status: DISCONTINUED | OUTPATIENT
Start: 2024-12-03 | End: 2024-12-03 | Stop reason: HOSPADM

## 2024-12-03 RX ORDER — DIPHENHYDRAMINE HYDROCHLORIDE 50 MG/ML
12.5 INJECTION INTRAMUSCULAR; INTRAVENOUS ONCE AS NEEDED
Status: DISCONTINUED | OUTPATIENT
Start: 2024-12-03 | End: 2024-12-03 | Stop reason: HOSPADM

## 2024-12-03 RX ORDER — GABAPENTIN 300 MG/1
300 CAPSULE ORAL ONCE
Status: COMPLETED | OUTPATIENT
Start: 2024-12-03 | End: 2024-12-03

## 2024-12-03 RX ORDER — ASPIRIN 81 MG/1
81 TABLET ORAL 2 TIMES DAILY
Qty: 30 TABLET | Refills: 0 | Status: SHIPPED | OUTPATIENT
Start: 2024-12-03 | End: 2024-12-03

## 2024-12-03 RX ORDER — OXYCODONE HYDROCHLORIDE 5 MG/1
5 TABLET ORAL EVERY 4 HOURS PRN
Qty: 15 TABLET | Refills: 0 | Status: SHIPPED | OUTPATIENT
Start: 2024-12-03 | End: 2024-12-03

## 2024-12-03 ASSESSMENT — PAIN SCALES - GENERAL
PAINLEVEL_OUTOF10: 5 - MODERATE PAIN
PAINLEVEL_OUTOF10: 0 - NO PAIN

## 2024-12-03 ASSESSMENT — COLUMBIA-SUICIDE SEVERITY RATING SCALE - C-SSRS
1. IN THE PAST MONTH, HAVE YOU WISHED YOU WERE DEAD OR WISHED YOU COULD GO TO SLEEP AND NOT WAKE UP?: NO
6. HAVE YOU EVER DONE ANYTHING, STARTED TO DO ANYTHING, OR PREPARED TO DO ANYTHING TO END YOUR LIFE?: NO
2. HAVE YOU ACTUALLY HAD ANY THOUGHTS OF KILLING YOURSELF?: NO

## 2024-12-03 ASSESSMENT — PAIN - FUNCTIONAL ASSESSMENT
PAIN_FUNCTIONAL_ASSESSMENT: WONG-BAKER FACES
PAIN_FUNCTIONAL_ASSESSMENT: 0-10
PAIN_FUNCTIONAL_ASSESSMENT: WONG-BAKER FACES
PAIN_FUNCTIONAL_ASSESSMENT: WONG-BAKER FACES
PAIN_FUNCTIONAL_ASSESSMENT: 0-10
PAIN_FUNCTIONAL_ASSESSMENT: WONG-BAKER FACES

## 2024-12-03 NOTE — OP NOTE
Right shoulder arthroscopy, rotator cuff repair, subacromial decompression acromioplasty, extensive intra-articular debridement and synovectomy (R) Operative Note     Date: 12/3/2024  OR Location: [facility]    Name: Clemente Coronado, : 1970, Age: 54 y.o., MRN: [id number], Sex: male    Diagnosis  Pre-op Diagnosis      * Tear of right rotator cuff, unspecified tear extent, unspecified whether traumatic [M75.101]     * Shoulder impingement, right [M25.811]     * Degenerative superior labral anterior-to-posterior (SLAP) tear of right shoulder [S43.431A]     * Traumatic complete tear of right rotator cuff, initial encounter [S46.011A]     * Synovitis of right shoulder [M65.911] Post-op Diagnosis     * Tear of right rotator cuff, unspecified tear extent, unspecified whether traumatic [M75.101]     * Shoulder impingement, right [M25.811]     * Degenerative superior labral anterior-to-posterior (SLAP) tear of right shoulder [S43.431A]     * Traumatic complete tear of right rotator cuff, initial encounter [S46.011A]     * Synovitis of right shoulder [M65.911]     Procedures  1.  Right shoulder arthroscopy, rotator cuff repair of massive tear involving the subscapularis, supraspinatus and infraspinatus augmented with Regeneten (22 modifier)  2.  Right shoulder arthroscopic subacromial decompression and acromioplasty  3.  Right shoulder arthroscopic extensive intra-articular debridement and synovectomy    Surgeons      * Alfonzo Trevizo - Primary    Resident/Fellow/Other Assistant:  Surgeons and Role:     * Gabrielle Lopresti, PA-C - [employer] Assist    Staff:   Circulator: Carmen  Scrub Person: Ina  Scrub Person: Lali Taveras Circulator: Perla    Anesthesia Staff: Anesthesiologist: Chaparro Valencia MD  CRNA: JUDY Cardozo-CRNA    Procedure Summary  Anesthesia: Regional, General  ASA: II  Estimated Blood Loss: 5mL  Intra-op Medications:   Administrations occurring from 1005 to 1215 on 24:   Medication Name Total  Dose   EPINEPHrine (Adrenalin) 1 mg/mL 1 mg in sodium chloride 0.9 % 3,000 mL irrigation 1 mL   dexAMETHasone 4 mg/mL 4 mg   ePHEDrine injection 10 mg   fentaNYL (Sublimaze) injection 50 mcg/mL 50 mcg   ketorolac (Toradol) 30 mg 15 mg   LR infusion Cannot be calculated   lidocaine (Xylocaine) 1 % 5 mL   ondansetron 2 mg/mL 4 mg   propofol (Diprivan) injection 10 mg/mL 200 mg   ceFAZolin (Ancef) 2 g in dextrose (iso)  mL 2 g          Anesthesia Record               Intraprocedure I/O Totals       None           Specimen: No specimens collected      Drains and/or Catheters: * None in log *    Tourniquet Times:         Implants:  Implants       Type Name Action Serial No.       HS FIBER ULTRALOOP SUTURE Implanted       HS FIBER ULTRALOOP 2 SUTURE Implanted       HS FIBER ULTRALOOP SUTURE Implanted       HS FIBER ULTRALOOP 2 SUTURE Implanted       HD SCORPION LNT SYSTEM, 4.75 BC KL SWLCK Implanted       HS FIBER ULTRALOOP SUTURE Implanted       HS FIBER ULTRALOOP 2 SUTURE Implanted       HS FIBER ULTRALOOP SUTURE Implanted      Implant ANCHOR, SWIVEL LOCK, BIOMPOSITE, KNOTLESS, 4.75MM X 19.1MM, W/P2 SUTURE BLUE - [id number] Implanted      [place] 4.75MM SUTURE ANCHOR WITH THREE MINITAPE SUTURES (BLUE, COBRAID WHITE, COBRAID BLUE) Implanted [id number]     Implant ANCHOR, SWIVEL LOCK, BIOMPOSITE, KNOTLESS, 4.75MM X 19.1MM, W/P2 SUTURE BLUE - [id number] Implanted      Implant HD SCORPION LNT SYSTEM, 4.75 BC KL SWLCK Implanted [id number]     Implant BONE ANCHORS 3, WITH ARTHRO DELIVERY SYS ADVANCED - [id number] Implanted      Implant DELIVERY SYSTEM, ARTHROSCOPIC, BIO INDUCTIVE, MEDIUM - [id number] Implanted      Implant ANCHORS, TENDON 8 - [id number] Implanted             Findings: Massive full-thickness tear of the subscapularis, supraspinatus and infraspinatus, subacromial spur with associated subacromial impingement and bursitis, extensive intra-articular glenohumeral synovitis and degenerative labral fraying,  minimal degenerative changes of the glenohumeral joint surface    Indications: Clemente Coronado is an 54 y.o. male who is having surgery for right shoulder massive full-thickness retracted rotator cuff tear, subacromial impingement/bursitis, extensive glenohumeral synovitis.  MRI confirmed the above.  After long discussion with the patient, he elected to proceed with surgical intervention of form of a right shoulder arthroscopy, rotator cuff repair, subacromial decompression and acromioplasty, extensive intra-articular debridement synovectomy.  I thoroughly explained the risks and benefits as well as the expected postoperative timeline for the proposed procedure versus nonoperative management. Risks of this procedure include but are not limited to bleeding, infection, nerve injury, DVT and failure of repair or implant.  The patient expressed understanding and wished to proceed with surgical intervention.  All questions were answered. They were consented to the above procedure at bedside.    The patient was seen in the preoperative area. The risks, benefits, complications, treatment options, non-operative alternatives, expected recovery and outcomes were discussed with the patient. The possibilities of reaction to medication, pulmonary aspiration, injury to surrounding structures, bleeding, recurrent infection, the need for additional procedures, failure to diagnose a condition, and creating a complication requiring transfusion or operation were discussed with the patient. The patient concurred with the proposed plan, giving informed consent.  The site of surgery was properly noted/marked if necessary per policy. The patient has been actively warmed in preoperative area. Preoperative antibiotics have been ordered and given within 1 hours of incision. Venous thrombosis prophylaxis have been ordered including bilateral sequential compression devices    Procedure Details:   The patient was seen in the preoperative holding  area where the correct site and side were signed my initials. The patient was seen by the anesthesia team and a preoperative regional anesthetic block was administered.  The patient was brought back to the operating room after smooth induction of LMA anesthesia the patient was placed in the beach chair position.  All bony prominences were padded.  The patient was belted and posted.  SCD boots were placed on bilateral lower extremities.  The contralateral arm was placed in a well-padded arm epps.  The operative arm was prepped and draped in usual sterile fashion and placed in the pneumatic arm epps.  Prior to the start of the procedure, preoperative antibiotics were administered by the anesthesia team.  Prior to incision, a preoperative time-out was performed confirming the correct patient, side, site and procedure to be performed.  All in the room were in agreement.    We began the procedure with the standard posterior portal to the shoulder.  Local anesthetic was administered at the posterior portal site and the posterior portal was established with a 11. Blade through the skin.  The arthroscope was introduced atraumatically into the glenohumeral joint. An anterior portal was established using an outside in technique with spinal needle.  The skin was incised with a #11 blade and a threaded cannula was inserted atraumatically. Diagnostic arthroscopy was performed demonstrating intact chondral surfaces on the glenoid and the humeral head.  There was extensive synovitis throughout the glenohumeral joint.  There was a massive rotator cuff tear involving the entirety of the supraspinatus, anterior aspect of the infraspinatus and majority of the subscapularis with retraction back to the level of the glenoid.  Extensive mobilization was performed with a combination of arthroscopic shaver and ablator in order to appropriately mobilize the subscapularis, supraspinatus and infraspinatus tendons to achieve a minimal  tension repair.  The biceps tendon was absent from the joint as he had a prior biceps tenodesis.    An extensive intra-articular debridement and synovectomy was performed in the anterior superior and posterior aspects of the glenohumeral joint, glenoid, glenoid labrum, humeral head, rotator cuff, middle glenohumeral ligament and capsular tissue with a combination of arthroscopic shaver and ablator.    We then proceeded with the subscapularis repair portion of the case.  The subscapularis was fully mobilized via the extensive debridement.  Once the subscapularis was mobilized, a gentle decortication was performed of the lesser tuberosity in preparation for repair.  Ultra loop sutures x 4 were passed in luggage tag fashion through the subscapularis tendon tissue.  This demonstrated excellent capture of the tissue which was unable to be mobilized back to its footprint on the lesser tuberosity.  The 2 inferior ultra loop sutures were secured to the lesser tuberosity using a 4.75 biocomposite SwiveLock which was punched and inserted after the sutures were appropriately tensioned.  This demonstrated excellent repair of the midportion of the subscapularis.  This was repeated for the upper portion of the subscapularis where the 2 superior ultra loop sutures were secured to the superior aspect of the lesser tuberosity using a 4.75 biocomposite SwiveLock which was punched and inserted after the sutures were appropriately tensioned.  This demonstrated an excellent repair of the subscapularis down to its footprint on the lesser tuberosity which was now stable to probing with restoration of the normal tension.    At this point the arthroscope was removed and inserted into the subacromial space.  A lateral portal was established using outside in technique with a spinal needle followed by a sharp incision through the skin with a #11 blade and the arthroscopic trocar to dilate the portal.  There was extensive bursitis, and thus an  extensive bursectomy was performed using a combination of the arthroscopic shaver and arthroscopic ablator.  The coracoacromial ligament was elevated off of the anterior acromion revealing an anterior acromial spur.  Using a bone cutting shaver, the subacromial spur was removed back to a flat surface completing the acromioplasty.  The rotator cuff was inspected from the acromial side which demonstrated a massive tear involving the entirety of the supraspinatus extending into the infraspinatus with a longitudinal split.  A decortication was performed of the greater tuberosity in preparation for repair.  A single 4.75 triple loaded bio composite Healicoil anchor was punched and inserted along the articular margin.  Sutures were then passed sequentially using a self retrieving suture passing device.  The most posterior suture repair was used to perform a side-to-side repair of the longitudinal split.  1 limb was passed through the infraspinatus tendon tissue and 1 limb was passed through the supraspinatus tendon tissue and these were tied using arthroscopic knot tying techniques demonstrating an excellent longitudinal repair/marginal convergence.  1 limb of each suture was then secured to a lateral row 4.75 biocomposite swivel lock which was punched and inserted along the lateral aspect of the greater tuberosity after the sutures were appropriately tensioned.  This was repeated for the second limb of each suture.  This demonstrated an excellent double row repair with restoration of the tendon down to its attachment on the greater tuberosity.    Given that this is a revision repair in the setting of a massive retracted tear with moderate tendinosis of the tendon tissue, we augmented the repair using the Regeneten bio inductive collagen implant.  The implant was positioned appropriately over the superior rotator cuff and secured with absorbable staples.  This was performed without complication.    Arthroscopic photos  were taken throughout.  Excess arthroscopic fluid was drained from the shoulder and the arthroscopic instruments were removed.      The wounds were thoroughly irrigated.  Portals were closed with buried interrupted 3-0 Monocryl sutures and dressed with Steri-Strips, dry gauze and Tegaderms.  The operative arm was placed in a shoulder sling with an abduction pillow with a cryotherapy pad placed on the operative shoulder.    At the end of the procedure all needle, lap and instrument counts were correct.    The patient was woken from anesthesia and transferred to the recovery room in stable condition.  The patient tolerated the procedure well.    I was present for all critical portions of this case.    Postoperative instructions:  The patient will remain in a shoulder sling with an abduction pillow at all times except for bathing.  The patient will return to see me in 2 weeks for a routine 2 week postoperative visit. They will begin PT according to the PT protocol appropriate for their surgery.    Gabrielle LoPresti, PA-C was present for the entire case.  Her assistance was necessary and critical to the successful completion of the procedure.  She provided skilled assistance with arm positioning, arthroscope manipulation, implant insertion, retraction and wound closure.  A qualified assistant was not available to perform her portion of the case.    A 22 modifier was added to the rotator cuff repair portion of this case as it was a massive retracted tear requiring additional implants, operative time and added case complexity    Complications:  None; patient tolerated the procedure well.    Disposition: PACU - hemodynamically stable.  Condition: stable     Attending Attestation: I performed the procedure.    Alfonzo Trevizo  Phone Number: [phone]

## 2024-12-03 NOTE — BRIEF OP NOTE
Date: 12/3/2024  OR Location: [facility]    Name: Clemente Coronado, : 1970, Age: 54 y.o., MRN: [id number], Sex: male    Diagnosis  Pre-op Diagnosis      * Tear of right rotator cuff, unspecified tear extent, unspecified whether traumatic [M75.101]     * Shoulder impingement, right [M25.811]     * Degenerative superior labral anterior-to-posterior (SLAP) tear of right shoulder [S43.431A]     * Traumatic complete tear of right rotator cuff, initial encounter [S46.011A]     * Synovitis of right shoulder [M65.911] Post-op Diagnosis     * Tear of right rotator cuff, unspecified tear extent, unspecified whether traumatic [M75.101]     * Shoulder impingement, right [M25.811]     * Degenerative superior labral anterior-to-posterior (SLAP) tear of right shoulder [S43.431A]     * Traumatic complete tear of right rotator cuff, initial encounter [S46.011A]     * Synovitis of right shoulder [M65.911]     Procedures  1.  Right shoulder arthroscopy, rotator cuff repair of massive tear involving the subscapularis, supraspinatus and infraspinatus augmented with Regeneten (22 modifier)  2.  Right shoulder arthroscopic subacromial decompression and acromioplasty  3.  Right shoulder arthroscopic extensive intra-articular debridement and synovectomy    Surgeons      * Alfonzo Trevizo - Primary    Resident/Fellow/Other Assistant:  Surgeons and Role:     * Gabrielle Lopresti, PA-C - [employer] Assist    Staff:   Circulator: Carmen Cervantesub Person: Ina Cervantesub Person: Lali Taveras Circulator: Perla    Anesthesia Staff: Anesthesiologist: Chaparro Valencia MD  CRNA: JUDY Cardozo-CRNA    Procedure Summary  Anesthesia: Regional, General  ASA: II  Estimated Blood Loss: 5mL  Intra-op Medications:   Administrations occurring from 1005 to 1215 on 24:   Medication Name Total Dose   EPINEPHrine (Adrenalin) 1 mg/mL 1 mg in sodium chloride 0.9 % 3,000 mL irrigation 1 mL   dexAMETHasone 4 mg/mL 4 mg   ePHEDrine injection 10 mg   fentaNYL  (Sublimaze) injection 50 mcg/mL 50 mcg   ketorolac (Toradol) 30 mg 15 mg   LR infusion Cannot be calculated   lidocaine (Xylocaine) 1 % 5 mL   ondansetron 2 mg/mL 4 mg   propofol (Diprivan) injection 10 mg/mL 200 mg   ceFAZolin (Ancef) 2 g in dextrose (iso)  mL 2 g          Anesthesia Record               Intraprocedure I/O Totals       None           Specimen: No specimens collected     Findings: Massive full-thickness tear of the subscapularis, supraspinatus and infraspinatus, subacromial spur with associated subacromial impingement and bursitis, extensive intra-articular glenohumeral synovitis and degenerative labral fraying, minimal degenerative changes of the glenohumeral joint surface    Complications:  None; patient tolerated the procedure well.     Disposition: PACU - hemodynamically stable.  Condition: stable  Specimens Collected: No specimens collected  Attending Attestation: I performed the procedure.    Alfonzo Trevizo  Phone Number: [phone]

## 2024-12-03 NOTE — ANESTHESIA PREPROCEDURE EVALUATION
Patient: Clemente Coronado    Procedure Information       Date/Time: 12/03/24 1005    Procedure: Right shoulder arthroscopy, rotator cuff repair, subacromial decompression acromioplasty, extensive intra-articular debridement and synovectomy ** 1.5hr ** ( S+N Healicoil, Q fix, Regeneten; Arthrex Swivelock , beachchair ) (Right: Shoulder) - 1.5hr    Location: [facility] / [facility]    Surgeons: Alfonzo Trevizo MD            Relevant Problems   Anesthesia (within normal limits)      Cardiac   (-) History of coronary artery bypass graft   (-) Pacemaker      Pulmonary   (-) Asthma   (-) Chronic obstructive pulmonary disease (Multi)   (-) BRENDA (obstructive sleep apnea)      Neuro   (-) CVA (cerebral vascular accident) (Multi)   (-) Seizures (Multi)      Endocrine   (-) Diabetes mellitus, type 2 (Multi)      Hematology  H/o NHL in 2005, s/p surgery and chemo   (+) Hematologic malignancy (Multi)   (-) Coagulopathy (Multi)       Clinical information reviewed:   Tobacco  Allergies  Meds   Med Hx  Surg Hx   Fam Hx  Soc Hx        NPO Detail:  No data recorded     Physical Exam    Airway  Mallampati: I  TM distance: >3 FB  Neck ROM: full     Cardiovascular    Dental    Pulmonary    Abdominal            Anesthesia Plan    History of general anesthesia?: yes  History of complications of general anesthesia?: no    ASA 2     general and regional     intravenous induction   Postoperative administration of opioids is intended.  Anesthetic plan and risks discussed with patient.

## 2024-12-03 NOTE — PERIOPERATIVE NURSING NOTE
POC explained to pt.Monitors removed, pt observed resting quietly with eyes closed, no facial grimacing or other s/s of discomfort @ this time

## 2024-12-03 NOTE — ADDENDUM NOTE
Addendum  created 12/03/24 1407 by Chaparro Valencia MD    Child order released for a procedure order, Clinical Note Signed, Intraprocedure Blocks edited, Order Canceled from Note

## 2024-12-03 NOTE — ANESTHESIA POSTPROCEDURE EVALUATION
Patient: Clemente Coronado    Procedure Summary       Date: 12/03/24 Room / Location: [facility] / [facility]    Anesthesia Start: 1014 Anesthesia Stop: 1226    Procedure: Right shoulder arthroscopy, rotator cuff repair, subacromial decompression acromioplasty, extensive intra-articular debridement and synovectomy (Right: Shoulder) Diagnosis:       Tear of right rotator cuff, unspecified tear extent, unspecified whether traumatic      Shoulder impingement, right      Degenerative superior labral anterior-to-posterior (SLAP) tear of right shoulder      Traumatic complete tear of right rotator cuff, initial encounter      Synovitis of right shoulder      (Tear of right rotator cuff, unspecified tear extent, unspecified whether traumatic [M75.101])      (Shoulder impingement, right [M25.811])      (Degenerative superior labral anterior-to-posterior (SLAP) tear of right shoulder [S43.431A])      (Traumatic complete tear of right rotator cuff, initial encounter [S46.011A])      (Synovitis of right shoulder [M65.911])    Surgeons: Alfonzo Trevizo MD Responsible Provider: Chaparro Valencia MD    Anesthesia Type: general, regional ASA Status: 2            Anesthesia Type: general, regional    Vitals Value Taken Time   /59 12/03/24 1235   Temp 35.5 °C (95.9 °F) 12/03/24 1221   Pulse 73 12/03/24 1235   Resp 18 12/03/24 1235   SpO2 98 % 12/03/24 1235       Anesthesia Post Evaluation    Patient location during evaluation: PACU  Patient participation: complete - patient participated  Level of consciousness: awake  Pain management: adequate  Multimodal analgesia pain management approach  Airway patency: patent  Cardiovascular status: acceptable and hemodynamically stable  Respiratory status: acceptable and spontaneous ventilation  Hydration status: euvolemic  Postoperative Nausea and Vomiting: none  Comments: No nausea or vomiting        There were no known notable events for this encounter.

## 2024-12-03 NOTE — ANESTHESIA PROCEDURE NOTES
Airway  Date/Time: 12/3/2024 10:23 AM  Urgency: elective    Airway not difficult    Staffing  Performed: CRNA   Authorized by: Chaparro Valencia MD    Performed by: JUDY Cardozo-ANDREIA  Patient location during procedure: OR    Indications and Patient Condition  Indications for airway management: anesthesia  Spontaneous ventilation: present  Sedation level: deep  Preoxygenated: yes  Patient position: sniffing  MILS maintained throughout  Mask difficulty assessment: 1 - vent by mask    Final Airway Details  Final airway type: supraglottic airway      Successful airway: Size 5     Number of attempts at approach: 1

## 2024-12-03 NOTE — ANESTHESIA PROCEDURE NOTES
Peripheral Block    Patient location during procedure: holding area  Start time: 12/3/2024 10:05 AM  End time: 12/3/2024 10:07 AM  Reason for block: at surgeon's request and post-op pain management  Staffing  Performed: attending   Authorized by: Chaparro Valencia MD    Performed by: Chaparro Valencia MD  Preanesthetic Checklist  Completed: patient identified, IV checked, site marked, risks and benefits discussed, surgical consent, monitors and equipment checked, pre-op evaluation and timeout performed   Timeout performed at: 12/3/2024 9:57 AM  Peripheral Block  Patient position: sitting  Prep: ChloraPrep  Patient monitoring: heart rate, cardiac monitor and continuous pulse ox  Block type: interscalene  Laterality: right  Injection technique: single-shot  Guidance: ultrasound guided  Local infiltration: ropivacaine  Infiltration strength: 0.5 %  Dose: 23 mL  Needle  Needle gauge: 20 G  Needle length: 5 cm  Needle localization: ultrasound guidance  Assessment  Injection assessment: negative aspiration for heme, no paresthesia on injection, incremental injection and local visualized surrounding nerve on ultrasound  Heart rate change: no  Slow fractionated injection: no

## 2024-12-03 NOTE — PERIOPERATIVE NURSING NOTE
Patient in Phase 2; dressed and up to chair with RN assist. Tolerating po fluids, no complaint of pain and no complaint of nausea.     Significant other at bedside; discussed discharge instructions with patient and Significant other. All questions at this time answered.  Wrong pharmacy listed in computer.  Changed pharmacy and notified surgeon to resend scripts.  New paper work printed with right pharmacy  and given to wife.    Patient clinically appropriate for discharge. IV removed and patient transported to discharge area via wheelchair.

## 2024-12-06 ENCOUNTER — PROCEDURE VISIT (OUTPATIENT)
Facility: CLINIC | Age: 54
End: 2024-12-06
Payer: COMMERCIAL

## 2024-12-06 PROCEDURE — L3670 SO ACRO/CLAV CAN WEB PRE OTS: HCPCS | Performed by: STUDENT IN AN ORGANIZED HEALTH CARE EDUCATION/TRAINING PROGRAM

## 2024-12-16 ENCOUNTER — APPOINTMENT (OUTPATIENT)
Dept: ORTHOPEDIC SURGERY | Facility: CLINIC | Age: 54
End: 2024-12-16
Payer: COMMERCIAL

## 2024-12-16 VITALS — HEIGHT: 75 IN | WEIGHT: 216 LBS | BODY MASS INDEX: 26.86 KG/M2

## 2024-12-16 DIAGNOSIS — M75.101 TEAR OF RIGHT ROTATOR CUFF, UNSPECIFIED TEAR EXTENT, UNSPECIFIED WHETHER TRAUMATIC: Primary | ICD-10-CM

## 2024-12-16 PROCEDURE — 3008F BODY MASS INDEX DOCD: CPT

## 2024-12-16 PROCEDURE — 99024 POSTOP FOLLOW-UP VISIT: CPT

## 2024-12-16 NOTE — PROGRESS NOTES
PRIMARY CARE PHYSICIAN: No primary care provider on file.    ORTHOPAEDIC POSTOPERATIVE VISIT    ASSESSMENT & PLAN    Impression: 54 y.o. male 13 days postop s/p Right shoulder arthroscopy, rotator cuff repair, subacromial decompression acromioplasty, extensive intra-articular debridement and synovectomy done 12/03/24    Plan:   Overall Clemente is doing well. His pain is controlled at this time. He may begin performing Codman pendulums out of his sling but otherwise will wear his sling at all times except for hygiene. He may begin working with physical therapy through the post-operative protocol for the above (Advanced by two weeks) starting in two weeks. We discussed his post-operative precautions and he expressed understanding. He will continue to ice and rest the shoulder and follow up with us in 4 weeks.    I reviewed the intraoperative findings with the patient and answered all their questions. I reviewed their postoperative timeline and plan with them. They understand the postoperative precautions and the treatment plan going forward.     Follow-Up: Patient will follow-up in 4 weeks, no x-rays    At the end of the visit, all questions were answered in full. The patient is in agreement with the plan and recommendations. They will call the office with any questions/concerns.      Note dictated with [employer] software. Completed without full typed error editing and sent to avoid delay.      SUBJECTIVE  CHIEF COMPLAINT:   Chief Complaint   Patient presents with    Right Shoulder - Post-op        HPI: Clemente Coronado is a 54 y.o. patient who is now 13 days postop s/p Right shoulder arthroscopy, rotator cuff repair, subacromial decompression acromioplasty, extensive intra-articular debridement and synovectomy done 12/03/24. Overall Clemente is doing well. He still has some persistent post-operative pain that is significant at night. However He is only taking Ibuprofen, ASA, and using the ice machine which  is controlling his pain. No concerns at this time.      REVIEW OF SYSTEMS  Constitutional: See HPI for pain assessment, No significant weight loss, recent trauma  Cardiovascular: No chest pain, shortness of breath  Respiratory: No difficulty breathing, cough  Gastrointestinal: No nausea, vomiting, diarrhea, constipation  Musculoskeletal: Noted in HPI, positive for pain, restricted motion, stiffness  Integumentary: No rashes, easy bruising, redness   Neurological: no numbness or tingling in extremities, no gait disturbances   Psychiatric: No mood changes, memory changes, social issues  Heme/Lymph: no excessive swelling, easy bruising, excessive bleeding  ENT: No hearing changes  Eyes: No vision changes    CURRENT MEDICATIONS:   Current Outpatient Medications   Medication Sig Dispense Refill    acetaminophen (Tylenol) 500 mg tablet Take 2 tablets (1,000 mg) by mouth every 8 hours if needed for mild pain (1 - 3). 60 tablet 1    aspirin 81 mg EC tablet Take 1 tablet (81 mg) by mouth 2 times a day. 15 tablet 0    ondansetron (Zofran) 4 mg tablet Take 1 tablet (4 mg) by mouth every 8 hours if needed for nausea or vomiting. 20 tablet 0    oxyCODONE (Roxicodone) 5 mg immediate release tablet Take 1 tablet (5 mg) by mouth every 4 hours if needed for severe pain (7 - 10) or moderate pain (4 - 6). 15 tablet 0     No current facility-administered medications for this visit.        OBJECTIVE    PHYSICAL EXAM      12/3/2024    10:00 AM 12/3/2024    10:05 AM 12/3/2024    10:10 AM 12/3/2024    12:21 PM 12/3/2024    12:35 PM 12/3/2024    12:55 PM 12/3/2024     1:34 PM   Vitals   Systolic 144 144 120 119 124 122 123   Diastolic 83 78 81 72 59 97 76   Heart Rate 66 67 66 55 73 64 56   Temp    35.5 °C (95.9 °F)  35.5 °C (95.9 °F) 36.2 °C (97.2 °F)   Resp 16 16 16 13 18 18 18      There is no height or weight on file to calculate BMI.    General: Well-appearing, no acute distress    Skin intact bilateral upper and lower extremities  No  erythema  No warmth    Detailed examination of the right shoulder demonstrates:  Surgical incision(s) healing well  No erythema or warmth  No drainage  No ecchymosis  Resolving swelling, minimal  Biceps contour normal  Shoulder range of motion deferred  Upper extremity motor grossly intact  C5-T1 sensation intact bilaterally  2+ radial pulses bilaterally  Warm and well-perfused, brisk capillary refill    IMAGING:   No new imaging

## 2024-12-16 NOTE — PROGRESS NOTES
PRIMARY CARE PHYSICIAN: No primary care provider on file.    ORTHOPAEDIC POSTOPERATIVE VISIT    ASSESSMENT & PLAN    Impression: 54 y.o. male 13 days postop s/p Right shoulder arthroscopy, rotator cuff repair, subacromial decompression acromioplasty, extensive intra-articular debridement and synovectomy done 12/03/24    Plan:   ***    I reviewed the intraoperative findings with the patient and answered all their questions. I reviewed their postoperative timeline and plan with them. They understand the postoperative precautions and the treatment plan going forward.     Follow-Up: Patient will follow-up ***    At the end of the visit, all questions were answered in full. The patient is in agreement with the plan and recommendations. They will call the office with any questions/concerns.      Note dictated with [employer] software. Completed without full typed error editing and sent to avoid delay.      SUBJECTIVE  CHIEF COMPLAINT: No chief complaint on file.       HPI: Clemente Coronado is a 54 y.o. patient who is now 13 days postop s/p Right shoulder arthroscopy, rotator cuff repair, subacromial decompression acromioplasty, extensive intra-articular debridement and synovectomy done 12/03/24. He is having a hard time sleeping and getting comfortablea    REVIEW OF SYSTEMS  Constitutional: See HPI for pain assessment, No significant weight loss, recent trauma  Cardiovascular: No chest pain, shortness of breath  Respiratory: No difficulty breathing, cough  Gastrointestinal: No nausea, vomiting, diarrhea, constipation  Musculoskeletal: Noted in HPI, positive for pain, restricted motion, stiffness  Integumentary: No rashes, easy bruising, redness   Neurological: no numbness or tingling in extremities, no gait disturbances   Psychiatric: No mood changes, memory changes, social issues  Heme/Lymph: no excessive swelling, easy bruising, excessive bleeding  ENT: No hearing changes  Eyes: No vision changes    CURRENT  MEDICATIONS:   Current Outpatient Medications   Medication Sig Dispense Refill    acetaminophen (Tylenol) 500 mg tablet Take 2 tablets (1,000 mg) by mouth every 8 hours if needed for mild pain (1 - 3). 60 tablet 1    aspirin 81 mg EC tablet Take 1 tablet (81 mg) by mouth 2 times a day. 15 tablet 0    ondansetron (Zofran) 4 mg tablet Take 1 tablet (4 mg) by mouth every 8 hours if needed for nausea or vomiting. 20 tablet 0    oxyCODONE (Roxicodone) 5 mg immediate release tablet Take 1 tablet (5 mg) by mouth every 4 hours if needed for severe pain (7 - 10) or moderate pain (4 - 6). 15 tablet 0     No current facility-administered medications for this visit.        OBJECTIVE    PHYSICAL EXAM      12/3/2024    10:00 AM 12/3/2024    10:05 AM 12/3/2024    10:10 AM 12/3/2024    12:21 PM 12/3/2024    12:35 PM 12/3/2024    12:55 PM 12/3/2024     1:34 PM   Vitals   Systolic 144 144 120 119 124 122 123   Diastolic 83 78 81 72 59 97 76   Heart Rate 66 67 66 55 73 64 56   Temp    35.5 °C (95.9 °F)  35.5 °C (95.9 °F) 36.2 °C (97.2 °F)   Resp 16 16 16 13 18 18 18      There is no height or weight on file to calculate BMI.    General: Well-appearing, no acute distress    Skin intact bilateral upper and lower extremities  No erythema  No warmth    Detailed examination of the *** shoulder demonstrates:  Surgical incision(s) healing well, Steri-Strips in place  No erythema or warmth  No drainage  No ecchymosis  Resolving swelling, minimal  Biceps contour normal  Shoulder range of motion deferred  Upper extremity motor grossly intact  C5-T1 sensation intact bilaterally  2+ radial pulses bilaterally  Warm and well-perfused, brisk capillary refill    IMAGING:   No new imaging      Alfonzo Trevizo MD  Attending Surgeon    Sports Medicine Orthopaedic Surgery  [facility] [place]  [facility] [employer]

## 2025-01-05 NOTE — PROGRESS NOTES
Physical Therapy    Physical Therapy Upper Extremity Evaluation    Patient Name: Clemente Coronado  MRN: [id number]  Today's Date: 1/6/2025  Time Calculation  Start Time: 1314  Stop Time: 1351  Time Calculation (min): 37 min  PT Evaluation Time Entry  PT Evaluation (Low) Time Entry: 28  PT Therapeutic Procedures Time Entry  Therapeutic Exercise Time Entry: 9                 Payor: [employer] [facility] / Plan: [employer] / Product Type: *No Product type* /     Reason for Referral: R shoulder sx-RTC repair 12/3/24  General Comment: Visit 1 of 40    Current Problem  Problem List Items Addressed This Visit             ICD-10-CM    Tear of right rotator cuff - Primary M75.101    Relevant Orders    Follow Up In Physical Therapy        Precautions  Precautions  Precautions Comment: Post op protocol, CA       Pain  Pain Assessment: 0-10  0-10 (Numeric) Pain Score: 1  Pain at worst: 8/10    SUBJECTIVE:   Chief complaint:  Pain location: R shoulder   S/p Right shoulder arthroscopy, rotator cuff repair, subacromial decompression acromioplasty, extensive intra-articular debridement and synovectomy.  (Massive tear involving the subscapularis, supraspinatus and infraspinatus)  Pt noted infection in incision-saw surgeon today and it was drained and he was placed on antibiotic. Bandaging present    Onset: sx 12/3/24.  Injured shoulder playing volleyball and lunged/reached for a ball. Not sure if this was the specific cause  Hx of a right rotator cuff repair done 6/2021.   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 No formal PT  Sling: At all times, except for hygiene  Range of Motion: NO shoulder ROM. Only allow hand, wrist, elbow motion  Exercise:  Pendulums and  strengthening, NO shoulder strengthening or motion exercises  If concomitant DCE performed-NO horizontal adduction until 8 weeks postoperatively   If concomitant biceps tenodesis performed-NO active biceps strengthening until 8 weeks postoperatively    Upper Extremity ROM: (WNL unless documented below) (p=pain)  ROM in Degrees RIGHT PROM LEFT AROM   Shoulder Flexion 80    Shoulder Abduction 15    Shoulder ER 10 65   Shoulder IR Abdomen     Elbow Flexion     Elbow Extension     Wrist Flexion     Wrist Extension       Upper Extremity Strength: (WNL unless documented below) (p=pain)  MMT 5/5 max  RIGHT LEFT   Shoulder Flexion NT 4+   Shoulder Abduction NT 4+   Shoulder ER NT 4+   Shoulder IR NT  5   Elbow Flexion     Elbow Extension     Wrist Flexion     Wrist Extension           TREATMENT:  Initial evaluation completed. Issued and reviewed HEP with pt that included:  Access Code: [email or web address]  URL: [email or web address]  Date: 01/06/2025  Prepared by: Sarah Chan    Exercises  - Circular Shoulder Pendulum with Table Support  - 2 x daily - 7 x weekly - 2-3 sets - 10 reps  - Flexion-Extension Shoulder Pendulum with Table Support  - 2 x daily - 7 x weekly - 2-3 sets - 10 reps  - Seated Scapular Retraction  - 2 x daily - 7 x weekly - 2-3 sets - 10 reps  - Seated Elbow Flexion and Extension AROM  - 2 x daily - 7 x weekly - 2-3 sets - 10 reps  - Seated Gripping Towel  - 2 x daily - 7 x weekly - 2-3 sets - 10 reps  - Wrist Flexion Extension AROM - Palms Down  - 2 x daily - 7 x weekly - 2-3 sets - 10 reps    LAD R shoulder with passive shoulder flexion in tolerable range     Outcome Measure:  Other Measures  Disability of Arm Shoulder Hand (DASH): 79.55    ASSESSEMENT  The pt presents with  signs and symptoms consistent with the physical therapy diagnosis of R shoulder pain, weakness and decrease ROM s/p Right shoulder arthroscopy, rotator cuff repair, subacromial decompression acromioplasty, extensive intra-articular debridement and synovectomy 12/3/24. He presents in sling. Bandaging present due to infection in incision site. Tolerated initial eval well.   The pt will benefit from a therapy program to restore prior level of function, reduce pain, increase AROM, increase strength, and improve posture.    The physical therapy prognosis is good for the patient to achieve their goals.   The pt tolerated therapy treatment today well with no adverse effects.    Personal factors/barriers to learning that impact therapy include:  Chronicity  Clinical presentation: Stable and/or uncomplicated characteristics  Level of clinical decision making is Low    PLAN  The pt will be seen 2 time(s) a week for 12 weeks.      The pt has been educated about the risks and benefits of physical therapy including manual therapy treatments and gives consent for treatment.     The patient will benefit from physical therapy treatment to include: therapeutic exercises, therapeutic activities, neurological re-education, manual therapy, modalities, and a home exercise program.       Goals:  Active       PT Problem       Reduce pain at worst to less than or = 2/10 with all functional and recreational activity.        Start:  01/06/25    Expected End:  04/06/25            Increase ROM/flexibility to WFL to perform daily functional activities including dressing/bathing/grooming tasks and other household and work related tasks        Start:  01/06/25    Expected End:  04/06/25            Increase by > or = 1/2 mm grade to improve lifting/carrying household and work items to perform daily tasks without increased pain/compensation         Start:  01/06/25    Expected End:  04/06/25            Decrease Quick Dash score by > or = 8 points         Start:  01/06/25    Expected End:  04/06/25            Patient will demonstrate independence in home program for support of progression       Start:  01/06/25    Expected End:  04/06/25

## 2025-01-06 ENCOUNTER — EVALUATION (OUTPATIENT)
Dept: PHYSICAL THERAPY | Facility: CLINIC | Age: 55
End: 2025-01-06
Payer: COMMERCIAL

## 2025-01-06 ENCOUNTER — OFFICE VISIT (OUTPATIENT)
Dept: ORTHOPEDIC SURGERY | Facility: CLINIC | Age: 55
End: 2025-01-06
Payer: COMMERCIAL

## 2025-01-06 VITALS — BODY MASS INDEX: 26.36 KG/M2 | HEIGHT: 75 IN | WEIGHT: 212 LBS

## 2025-01-06 DIAGNOSIS — M75.101 TEAR OF RIGHT ROTATOR CUFF, UNSPECIFIED TEAR EXTENT, UNSPECIFIED WHETHER TRAUMATIC: ICD-10-CM

## 2025-01-06 DIAGNOSIS — M65.911 SYNOVITIS OF RIGHT SHOULDER: ICD-10-CM

## 2025-01-06 DIAGNOSIS — T81.41XA ABSCESS INVOLVING SUTURE: Primary | ICD-10-CM

## 2025-01-06 DIAGNOSIS — M75.101 TEAR OF RIGHT ROTATOR CUFF, UNSPECIFIED TEAR EXTENT, UNSPECIFIED WHETHER TRAUMATIC: Primary | ICD-10-CM

## 2025-01-06 PROCEDURE — 97161 PT EVAL LOW COMPLEX 20 MIN: CPT | Mod: GP | Performed by: PHYSICAL THERAPIST

## 2025-01-06 PROCEDURE — 97110 THERAPEUTIC EXERCISES: CPT | Mod: GP | Performed by: PHYSICAL THERAPIST

## 2025-01-06 PROCEDURE — 99024 POSTOP FOLLOW-UP VISIT: CPT | Performed by: STUDENT IN AN ORGANIZED HEALTH CARE EDUCATION/TRAINING PROGRAM

## 2025-01-06 PROCEDURE — 3008F BODY MASS INDEX DOCD: CPT | Performed by: STUDENT IN AN ORGANIZED HEALTH CARE EDUCATION/TRAINING PROGRAM

## 2025-01-06 PROCEDURE — 1036F TOBACCO NON-USER: CPT | Performed by: STUDENT IN AN ORGANIZED HEALTH CARE EDUCATION/TRAINING PROGRAM

## 2025-01-06 RX ORDER — MELOXICAM 15 MG/1
15 TABLET ORAL DAILY
Qty: 30 TABLET | Refills: 3 | Status: SHIPPED | OUTPATIENT
Start: 2025-01-06 | End: 2025-05-06

## 2025-01-06 RX ORDER — MELOXICAM 15 MG/1
15 TABLET ORAL DAILY
Qty: 30 TABLET | Refills: 3 | Status: SHIPPED | OUTPATIENT
Start: 2025-01-06 | End: 2025-01-06 | Stop reason: SDUPTHER

## 2025-01-06 RX ORDER — SULFAMETHOXAZOLE AND TRIMETHOPRIM 800; 160 MG/1; MG/1
1 TABLET ORAL 2 TIMES DAILY
Qty: 14 TABLET | Refills: 0 | Status: SHIPPED | OUTPATIENT
Start: 2025-01-06 | End: 2025-01-13

## 2025-01-06 ASSESSMENT — ENCOUNTER SYMPTOMS
DEPRESSION: 0
OCCASIONAL FEELINGS OF UNSTEADINESS: 0
LOSS OF SENSATION IN FEET: 0

## 2025-01-06 ASSESSMENT — PAIN SCALES - GENERAL
PAINLEVEL_OUTOF10: 1
PAINLEVEL_OUTOF10: 1

## 2025-01-06 ASSESSMENT — PAIN - FUNCTIONAL ASSESSMENT
PAIN_FUNCTIONAL_ASSESSMENT: 0-10
PAIN_FUNCTIONAL_ASSESSMENT: 0-10

## 2025-01-06 NOTE — PROGRESS NOTES
PRIMARY CARE PHYSICIAN: No primary care provider on file.    ORTHOPAEDIC POSTOPERATIVE VISIT    ASSESSMENT & PLAN    Impression: 54 y.o. male 5 weeks postop s/p Right shoulder arthroscopy, rotator cuff repair, subacromial decompression acromioplasty, extensive intra-articular debridement and synovectomy done 12/03/24    Plan:   Clemente presents to the office today for a wound check. He has developed what appears to be a suture abscess over the anterior portal incision. This was drained and cleaned in the office today. He was re-dressed with steri strips and dry gauze. Patient was provided with a prescription for Bactrim to minimize risk for infection. He will keep this clean and dry and closely monitor for any developing symptoms. He will return in one week for repeat wound check.     I reviewed their postoperative timeline and plan with them. They understand the postoperative precautions and the treatment plan going forward.     Follow-Up: Patient will follow-up in 1 week, no x-rays    At the end of the visit, all questions were answered in full. The patient is in agreement with the plan and recommendations. They will call the office with any questions/concerns.    Post visit addendum:  The patient continued to have drainage out of his anterior portal.  I sent him to my colleague for diagnostic ultrasound and possible aspiration.  There is some concern for infection versus seroma as the collection tracks deep to his rotator cuff but not into his glenohumeral joint.  We discussed his treatment options going forward.  I believe at this point he would benefit from a right shoulder arthroscopic irrigation, extensive intra-articular treatment and synovectomy.  I thoroughly explained the risks and benefits as well as the expected postoperative timeline for the proposed procedure versus nonoperative management. Risks of this procedure include but are not limited to bleeding, infection, nerve injury, DVT and failure of  repair or implant.  The patient expressed understanding and wished to proceed with surgical intervention.  All questions were answered. We will begin the presurgical process and find them a surgical date in a timely fashion that works for them.      Note dictated with [email or web address] software. Completed without full typed error editing and sent to avoid delay.      SUBJECTIVE  CHIEF COMPLAINT:   Chief Complaint   Patient presents with    Right Shoulder - Post-op        HPI: Clemente Coronado is a 54 y.o. patient who is now 5 weeks postop s/p Right shoulder arthroscopy, rotator cuff repair, subacromial decompression acromioplasty, extensive intra-articular debridement and synovectomy done 12/03/24. Clemente was recovering well post-operatively until this past Sunday when he noticed his anterior portal incision site was leaking. He expressed purulent drainage while in the shower that day. He reports that it has not stopped leaking since. All other incisions are healing well. He had no issues with the anterior incision prior. There is associated erythema and mild warmth localized to the anterior shoulder. Denies any fevers, chills or overall warmth of the right shoulder. His pain has been improving since Sx.  He is still wearing his sling as directed. No new injuries.     REVIEW OF SYSTEMS  Constitutional: See HPI for pain assessment, No significant weight loss, recent trauma  Cardiovascular: No chest pain, shortness of breath  Respiratory: No difficulty breathing, cough  Gastrointestinal: No nausea, vomiting, diarrhea, constipation  Musculoskeletal: Noted in HPI, positive for pain, restricted motion, stiffness  Integumentary: No rashes, easy bruising, redness   Neurological: no numbness or tingling in extremities, no gait disturbances   Psychiatric: No mood changes, memory changes, social issues  Heme/Lymph: no excessive swelling, easy bruising, excessive bleeding  ENT: No hearing changes  Eyes: No vision  "changes    CURRENT MEDICATIONS:   Current Outpatient Medications   Medication Sig Dispense Refill    acetaminophen (Tylenol) 500 mg tablet Take 2 tablets (1,000 mg) by mouth every 8 hours if needed for mild pain (1 - 3). 60 tablet 1    aspirin 81 mg EC tablet Take 1 tablet (81 mg) by mouth 2 times a day. 15 tablet 0    meloxicam (Mobic) 15 mg tablet Take 1 tablet (15 mg) by mouth once daily. 30 tablet 3    ondansetron (Zofran) 4 mg tablet Take 1 tablet (4 mg) by mouth every 8 hours if needed for nausea or vomiting. 20 tablet 0     No current facility-administered medications for this visit.        OBJECTIVE    PHYSICAL EXAM      12/3/2024    10:05 AM 12/3/2024    10:10 AM 12/3/2024    12:21 PM 12/3/2024    12:35 PM 12/3/2024    12:55 PM 12/3/2024     1:34 PM 12/16/2024    11:00 AM   Vitals   Systolic 144 120 119 124 122 123    Diastolic 78 81 72 59 97 76    Heart Rate 67 66 55 73 64 56    Temp   35.5 °C (95.9 °F)  35.5 °C (95.9 °F) 36.2 °C (97.2 °F)    Resp 16 16 13 18 18 18    Height       1.905 m (6' 3\")   Weight (lb)       216   BMI       27 kg/m2   BSA (m2)       2.28 m2   Visit Report       Report      There is no height or weight on file to calculate BMI.    General: Well-appearing, no acute distress    Skin intact bilateral upper and lower extremities  No erythema  No warmth    Detailed examination of the right shoulder demonstrates:  Surgical incision(s) healing well over posterior and lateral shoulder. Anterior portal incision with purulent drainage with associated localized mild erythema and warmth   No erythema or warmth extending over anterior/lateral shoulder  No ecchymosis  Resolving swelling, minimal  Biceps contour normal  Shoulder range of motion deferred  Upper extremity motor grossly intact  C5-T1 sensation intact bilaterally  2+ radial pulses bilaterally  Warm and well-perfused, brisk capillary refill    IMAGING:   No new imaging    "

## 2025-01-08 ENCOUNTER — HOSPITAL ENCOUNTER (OUTPATIENT)
Dept: RADIOLOGY | Facility: EXTERNAL LOCATION | Age: 55
Discharge: HOME | End: 2025-01-08

## 2025-01-08 ENCOUNTER — OFFICE VISIT (OUTPATIENT)
Dept: ORTHOPEDIC SURGERY | Age: 55
End: 2025-01-08
Payer: COMMERCIAL

## 2025-01-08 ENCOUNTER — HOSPITAL ENCOUNTER (OUTPATIENT)
Facility: HOSPITAL | Age: 55
Setting detail: OUTPATIENT SURGERY
End: 2025-01-08
Attending: STUDENT IN AN ORGANIZED HEALTH CARE EDUCATION/TRAINING PROGRAM | Admitting: STUDENT IN AN ORGANIZED HEALTH CARE EDUCATION/TRAINING PROGRAM
Payer: COMMERCIAL

## 2025-01-08 VITALS — HEIGHT: 75 IN | BODY MASS INDEX: 26.36 KG/M2 | WEIGHT: 212 LBS

## 2025-01-08 DIAGNOSIS — T81.41XA ABSCESS INVOLVING SUTURE: Primary | ICD-10-CM

## 2025-01-08 PROCEDURE — 20611 DRAIN/INJ JOINT/BURSA W/US: CPT | Performed by: EMERGENCY MEDICINE

## 2025-01-08 PROCEDURE — 1036F TOBACCO NON-USER: CPT | Performed by: EMERGENCY MEDICINE

## 2025-01-08 PROCEDURE — 87077 CULTURE AEROBIC IDENTIFY: CPT | Mod: PORLAB | Performed by: EMERGENCY MEDICINE

## 2025-01-08 PROCEDURE — 3008F BODY MASS INDEX DOCD: CPT | Performed by: EMERGENCY MEDICINE

## 2025-01-08 PROCEDURE — 99204 OFFICE O/P NEW MOD 45 MIN: CPT | Performed by: EMERGENCY MEDICINE

## 2025-01-08 NOTE — PROGRESS NOTES
Subjective    Patient ID: Clemente Coronado is a 54 y.o. male.    Chief Complaint: Follow-up of the Right Shoulder (New Patient, Referred by Dr. Trevizo. U/S to check for pocket suture abscess. Right shoulder sx done 12/03/2024)     Last Surgery: No surgery found  Last Surgery Date: No surgery found    Mr. Coronado is a very pleasant 54-year-old male coming in today for a diagnostic ultrasound and evaluation of his right shoulder.  He recently had surgery with Dr. Trevizo on 12/3/2024 and is coming in because he is having some drainage from the incision site.  He recently started antibiotics for a possible infection.  He has granulation tissue and has noticed both clear and purulent drainage soaking through his bandages.  He was sent here for an ultrasound and possible aspiration.  He has minimal pain.  No fevers.        Objective   Ortho Exam  Range of motion and strength testing deferred since he is still recovering from his procedure.  He does have a small area of erythema with a small area of wound dehiscence with clear and slightly purulent drainage along with some granulation tissue over the anterior aspect of the right shoulder.  Some very mild/faint surrounding erythema.    Image Results:  Point of Care Ultrasound  These images are not reportable by radiology and will not be interpreted   by  Radiologists.    X-rays of the right shoulder were reviewed and interpreted by me on 1/8/2025 and were grossly unremarkable without any evidence of acute injury or fracture.    Patient ID: Clemente Coronado is a 54 y.o. male.    L Inj/Asp: R subacromial bursa (Right anterior shoulder) on 1/8/2025 1:57 PM  Indications: diagnostic evaluation  Details: 18 G needle, ultrasound-guided anterior approach  Aspirate: 1 mL blood-tinged; sent for lab analysis  Outcome: tolerated well, no immediate complications  Procedure, treatment alternatives, risks and benefits explained, specific risks discussed. Consent was given by the patient. Immediately  prior to procedure a time out was called to verify the correct patient, procedure, equipment, support staff and site/side marked as required. Patient was prepped and draped in the usual sterile fashion.           Assessment/Plan   Encounter Diagnoses:  Abscess involving suture    Orders Placed This Encounter    L Inj/Asp    Sterile Fluid Culture/Smear    Point of Care Ultrasound     No follow-ups on file.      After discussing his treatment options we eventually agreed to perform a diagnostic ultrasound and it did reveal a small pocket of fluid with what appeared to be a stalk reaching down towards the rotator cuff.  I attempted an ultrasound-guided needle aspiration which yielded minimal fluid, which we will send to the lab for culture.  We redressed the wound and incision.  He is going to continue his antibiotics.  I notified his surgeon and they are going to contact him later today to discuss potential options moving forward.  He is going to follow-up with me as needed moving forward.    ** Please excuse any errors in grammar or translation related to this dictation. Voice recognition software was utilized to prepare this document. **       Balta Ding MD  [facility]

## 2025-01-09 ENCOUNTER — OFFICE VISIT (OUTPATIENT)
Dept: ORTHOPEDIC SURGERY | Facility: HOSPITAL | Age: 55
End: 2025-01-09
Payer: COMMERCIAL

## 2025-01-09 ENCOUNTER — ANESTHESIA EVENT (OUTPATIENT)
Dept: OPERATING ROOM | Facility: HOSPITAL | Age: 55
End: 2025-01-09

## 2025-01-09 ENCOUNTER — ANESTHESIA (OUTPATIENT)
Dept: OPERATING ROOM | Facility: HOSPITAL | Age: 55
End: 2025-01-09
Payer: COMMERCIAL

## 2025-01-09 DIAGNOSIS — T81.41XA ABSCESS INVOLVING SUTURE: Primary | ICD-10-CM

## 2025-01-09 PROCEDURE — 99211 OFF/OP EST MAY X REQ PHY/QHP: CPT | Performed by: STUDENT IN AN ORGANIZED HEALTH CARE EDUCATION/TRAINING PROGRAM

## 2025-01-09 PROCEDURE — 1036F TOBACCO NON-USER: CPT | Performed by: STUDENT IN AN ORGANIZED HEALTH CARE EDUCATION/TRAINING PROGRAM

## 2025-01-09 ASSESSMENT — PAIN SCALES - GENERAL: PAINLEVEL_OUTOF10: 2

## 2025-01-09 ASSESSMENT — PAIN - FUNCTIONAL ASSESSMENT: PAIN_FUNCTIONAL_ASSESSMENT: 0-10

## 2025-01-09 NOTE — PROGRESS NOTES
PRIMARY CARE PHYSICIAN: Shakeel Coronado DO    ORTHOPAEDIC POSTOPERATIVE VISIT    ASSESSMENT & PLAN    Impression: 54 y.o. male 5 weeks postop s/p Right shoulder arthroscopy, rotator cuff repair, subacromial decompression acromioplasty, extensive intra-articular debridement and synovectomy done 12/03/24    Plan:   Clemente presents to the office today for a wound check. He has developed what appears to be a suture abscess over the anterior portal incision with some persistent drainage.  The drainage has decreased significantly and it is now more of a clear fluid.  The anterior portal is quite open after the suture abscess has been superficially debrided.  Therefore we closed this with 4-0 nylon in simple fashion.  The patient tolerated this well.  He will continue with his oral antibiotic (Keflex and Bactrim).  If he continues to have drainage or if there is any concern for infection then we will proceed with an arthroscopic irrigation and debridement.  However, at this time he seems to be improving and we will hold off on surgery.  We will monitor this closely and he will return to see me on Monday for repeat evaluation.  Additionally, we will monitor his ultrasound-guided aspiration cultures closely and if they come back positive this will also be an indicator for arthroscopic irrigation and debridement.    I reviewed their postoperative timeline and plan with them. They understand the postoperative precautions and the treatment plan going forward.     Follow-Up: Patient will follow-up Monday, no x-rays    At the end of the visit, all questions were answered in full. The patient is in agreement with the plan and recommendations. They will call the office with any questions/concerns.    Note dictated with [email or web address] software. Completed without full typed error editing and sent to avoid delay.      SUBJECTIVE  CHIEF COMPLAINT:   Chief Complaint   Patient presents with    Right Shoulder - Post-op         HPI: Clemente Coronado is a 54 y.o. patient who is now 5 weeks postop s/p Right shoulder arthroscopy, rotator cuff repair, subacromial decompression acromioplasty, extensive intra-articular debridement and synovectomy done 12/03/24. He returns to clinic for a wound check.  He notes that the drainage has decreased significantly.  He denies any fevers or chills.  The surrounding erythema is also improving.  He is hoping to avoid surgery if at all possible.  Notably, he is on my partner who performed an ultrasound of the area which demonstrates some soft tissue swelling at the rotator cuff which is expected at this point in the recovery process with a possible small pocket of surrounding fluid; he was not able to aspirate any significant volume of fluid, however he was able to send a small amount for culture.  We will monitor this closely.    REVIEW OF SYSTEMS  Constitutional: See HPI for pain assessment, No significant weight loss, recent trauma  Cardiovascular: No chest pain, shortness of breath  Respiratory: No difficulty breathing, cough  Gastrointestinal: No nausea, vomiting, diarrhea, constipation  Musculoskeletal: Noted in HPI, positive for pain, restricted motion, stiffness  Integumentary: No rashes, easy bruising, redness   Neurological: no numbness or tingling in extremities, no gait disturbances   Psychiatric: No mood changes, memory changes, social issues  Heme/Lymph: no excessive swelling, easy bruising, excessive bleeding  ENT: No hearing changes  Eyes: No vision changes    CURRENT MEDICATIONS:   Current Outpatient Medications   Medication Sig Dispense Refill    acetaminophen (Tylenol) 500 mg tablet Take 2 tablets (1,000 mg) by mouth every 8 hours if needed for mild pain (1 - 3). 60 tablet 1    aspirin 81 mg EC tablet Take 1 tablet (81 mg) by mouth 2 times a day. (Patient not taking: Reported on 1/6/2025) 15 tablet 0    meloxicam (Mobic) 15 mg tablet Take 1 tablet (15 mg) by mouth once daily. 30 tablet 3     "ondansetron (Zofran) 4 mg tablet Take 1 tablet (4 mg) by mouth every 8 hours if needed for nausea or vomiting. 20 tablet 0    sulfamethoxazole-trimethoprim (Bactrim DS) 800-160 mg tablet Take 1 tablet by mouth 2 times a day for 7 days. 14 tablet 0     No current facility-administered medications for this visit.        OBJECTIVE    PHYSICAL EXAM      12/3/2024    12:21 PM 12/3/2024    12:35 PM 12/3/2024    12:55 PM 12/3/2024     1:34 PM 12/16/2024    11:00 AM 1/6/2025     8:30 AM 1/8/2025    10:58 AM   Vitals   Systolic 119 124 122 123      Diastolic 72 59 97 76      Heart Rate 55 73 64 56      Temp 35.5 °C (95.9 °F)  35.5 °C (95.9 °F) 36.2 °C (97.2 °F)      Resp 13 18 18 18      Height     1.905 m (6' 3\") 1.905 m (6' 3\") 1.905 m (6' 3\")   Weight (lb)     216 212 212   BMI     27 kg/m2 26.5 kg/m2 26.5 kg/m2   BSA (m2)     2.28 m2 2.26 m2 2.26 m2   Visit Report     Report Report Report      There is no height or weight on file to calculate BMI.    General: Well-appearing, no acute distress    Skin intact bilateral upper and lower extremities  No erythema  No warmth    Detailed examination of the right shoulder demonstrates:  Surgical incision(s) healing well over posterior and lateral shoulder. Anterior portal incision with persistent clear drainage that can be expressed out with some opening of the anterior incision; ENT incision was closed with 4-0 nylon suture in simple fashion under sterile conditions  Significantly improved erythema over the anterior shoulder  No ecchymosis  Resolving swelling, minimal  Biceps contour normal  Shoulder range of motion deferred  Upper extremity motor grossly intact  C5-T1 sensation intact bilaterally  2+ radial pulses bilaterally  Warm and well-perfused, brisk capillary refill    IMAGING:   No new imaging    "

## 2025-01-11 LAB
BACTERIA FLD CULT: NORMAL
GRAM STN SPEC: NORMAL
GRAM STN SPEC: NORMAL

## 2025-01-13 ENCOUNTER — APPOINTMENT (OUTPATIENT)
Dept: ORTHOPEDIC SURGERY | Facility: CLINIC | Age: 55
End: 2025-01-13
Payer: COMMERCIAL

## 2025-01-13 VITALS — WEIGHT: 212 LBS | HEIGHT: 75 IN | BODY MASS INDEX: 26.36 KG/M2

## 2025-01-13 DIAGNOSIS — T81.41XA ABSCESS INVOLVING SUTURE: Primary | ICD-10-CM

## 2025-01-13 DIAGNOSIS — M65.911 SYNOVITIS OF RIGHT SHOULDER: ICD-10-CM

## 2025-01-13 DIAGNOSIS — M01.X11: ICD-10-CM

## 2025-01-13 LAB
BACTERIA FLD CULT: ABNORMAL
GRAM STN SPEC: ABNORMAL
GRAM STN SPEC: ABNORMAL

## 2025-01-13 PROCEDURE — 3008F BODY MASS INDEX DOCD: CPT | Performed by: STUDENT IN AN ORGANIZED HEALTH CARE EDUCATION/TRAINING PROGRAM

## 2025-01-13 PROCEDURE — 99024 POSTOP FOLLOW-UP VISIT: CPT | Performed by: STUDENT IN AN ORGANIZED HEALTH CARE EDUCATION/TRAINING PROGRAM

## 2025-01-13 PROCEDURE — 1036F TOBACCO NON-USER: CPT | Performed by: STUDENT IN AN ORGANIZED HEALTH CARE EDUCATION/TRAINING PROGRAM

## 2025-01-13 ASSESSMENT — PAIN - FUNCTIONAL ASSESSMENT: PAIN_FUNCTIONAL_ASSESSMENT: 0-10

## 2025-01-13 ASSESSMENT — PAIN SCALES - GENERAL: PAINLEVEL_OUTOF10: 2

## 2025-01-13 NOTE — H&P (VIEW-ONLY)
PRIMARY CARE PHYSICIAN: Shakeel Coronado DO    ORTHOPAEDIC POSTOPERATIVE VISIT    ASSESSMENT & PLAN    Impression: 54 y.o. male 6 weeks postop s/p Right shoulder arthroscopy, rotator cuff repair, subacromial decompression acromioplasty, extensive intra-articular debridement and synovectomy done 12/03/24 with confirmed infection of Cutibacterium acnes.    Plan:   Clemente presents to the office today for a wound check.  He continues to have drainage of his right shoulder.  His ultrasound-guided aspiration of the shoulder subacromial space came back today is positive for cutibacterium acnes.  I do long discussion with him regarding his treatment options going forward.  I recommend being proactive with this and proceeding with an arthroscopic irrigation and debridement for infection.  He will then be restarted on antibiotics pending discussion with infectious disease and sensitivity of the Cutibacterium acnes culture.  He expressed understanding.  Will proceed with right shoulder arthroscopic irrigation and extensive intra-articular debridement in urgent fashion in order to minimize any further spread of the infection.   I thoroughly explained the risks and benefits as well as the expected postoperative timeline for the proposed procedure versus nonoperative management. Risks of this procedure include but are not limited to bleeding, infection, nerve injury, DVT and failure of repair or implant.  The patient expressed understanding and wished to proceed with surgical intervention.  All questions were answered. We will begin the presurgical process and find them a surgical date in a timely fashion that works for them.    I reviewed their postoperative timeline and plan with them. They understand the postoperative precautions and the treatment plan going forward.     At the end of the visit, all questions were answered in full. The patient is in agreement with the plan and recommendations. They will call the office with any  questions/concerns.    Note dictated with [employer] software. Completed without full typed error editing and sent to avoid delay.      SUBJECTIVE  CHIEF COMPLAINT:   Chief Complaint   Patient presents with    Right Shoulder - Post-op        HPI: Clemente Coronado is a 54 y.o. patient who is now 6 weeks postop s/p Right shoulder arthroscopy, rotator cuff repair, subacromial decompression acromioplasty, extensive intra-articular debridement and synovectomy done 12/03/24. He is coming in for another wound check. He states he is having come drainage still. Taking Bactrim and Mobic as directed. He has results come back for his culture which is positive for Cutibacterium acnes.  He continues to have drainage of the shoulder.      1/9/2025 HPI Visit Info:   He returns to clinic for a wound check.  He notes that the drainage has decreased significantly.  He denies any fevers or chills.  The surrounding erythema is also improving.  He is hoping to avoid surgery if at all possible.  Notably, he is on my partner who performed an ultrasound of the area which demonstrates some soft tissue swelling at the rotator cuff which is expected at this point in the recovery process with a possible small pocket of surrounding fluid; he was not able to aspirate any significant volume of fluid, however he was able to send a small amount for culture.  We will monitor this closely.    REVIEW OF SYSTEMS  Constitutional: See HPI for pain assessment, No significant weight loss, recent trauma  Cardiovascular: No chest pain, shortness of breath  Respiratory: No difficulty breathing, cough  Gastrointestinal: No nausea, vomiting, diarrhea, constipation  Musculoskeletal: Noted in HPI, positive for pain, restricted motion, stiffness  Integumentary: No rashes, easy bruising, redness   Neurological: no numbness or tingling in extremities, no gait disturbances   Psychiatric: No mood changes, memory changes, social issues  Heme/Lymph: no  "excessive swelling, easy bruising, excessive bleeding  ENT: No hearing changes  Eyes: No vision changes    CURRENT MEDICATIONS:   Current Outpatient Medications   Medication Sig Dispense Refill    acetaminophen (Tylenol) 500 mg tablet Take 2 tablets (1,000 mg) by mouth every 8 hours if needed for mild pain (1 - 3). 60 tablet 1    aspirin 81 mg EC tablet Take 1 tablet (81 mg) by mouth 2 times a day. (Patient not taking: Reported on 1/6/2025) 15 tablet 0    meloxicam (Mobic) 15 mg tablet Take 1 tablet (15 mg) by mouth once daily. 30 tablet 3    ondansetron (Zofran) 4 mg tablet Take 1 tablet (4 mg) by mouth every 8 hours if needed for nausea or vomiting. 20 tablet 0    sulfamethoxazole-trimethoprim (Bactrim DS) 800-160 mg tablet Take 1 tablet by mouth 2 times a day for 7 days. 14 tablet 0     No current facility-administered medications for this visit.        OBJECTIVE    PHYSICAL EXAM      12/3/2024    12:21 PM 12/3/2024    12:35 PM 12/3/2024    12:55 PM 12/3/2024     1:34 PM 12/16/2024    11:00 AM 1/6/2025     8:30 AM 1/8/2025    10:58 AM   Vitals   Systolic 119 124 122 123      Diastolic 72 59 97 76      Heart Rate 55 73 64 56      Temp 35.5 °C (95.9 °F)  35.5 °C (95.9 °F) 36.2 °C (97.2 °F)      Resp 13 18 18 18      Height     1.905 m (6' 3\") 1.905 m (6' 3\") 1.905 m (6' 3\")   Weight (lb)     216 212 212   BMI     27 kg/m2 26.5 kg/m2 26.5 kg/m2   BSA (m2)     2.28 m2 2.26 m2 2.26 m2   Visit Report     Report Report Report      There is no height or weight on file to calculate BMI.    General: Well-appearing, no acute distress    Skin intact bilateral upper and lower extremities  No erythema  No warmth    Detailed examination of the right shoulder demonstrates:  Surgical incision(s) healing well over posterior and lateral shoulder. Anterior portal incision with persistent slightly cloudy drainage that can be expressed  mild surrounding erythema  No ecchymosis  Resolving swelling  Biceps contour normal  Shoulder range " of motion deferred  Upper extremity motor grossly intact  C5-T1 sensation intact bilaterally  2+ radial pulses bilaterally  Warm and well-perfused, brisk capillary refill    IMAGING:   No new imaging

## 2025-01-13 NOTE — PROGRESS NOTES
PRIMARY CARE PHYSICIAN: Shakeel Coronado DO    ORTHOPAEDIC POSTOPERATIVE VISIT    ASSESSMENT & PLAN    Impression: 54 y.o. male 6 weeks postop s/p Right shoulder arthroscopy, rotator cuff repair, subacromial decompression acromioplasty, extensive intra-articular debridement and synovectomy done 12/03/24 with confirmed infection of Cutibacterium acnes.    Plan:   Clemente presents to the office today for a wound check.  He continues to have drainage of his right shoulder.  His ultrasound-guided aspiration of the shoulder subacromial space came back today is positive for cutibacterium acnes.  I do long discussion with him regarding his treatment options going forward.  I recommend being proactive with this and proceeding with an arthroscopic irrigation and debridement for infection.  He will then be restarted on antibiotics pending discussion with infectious disease and sensitivity of the Cutibacterium acnes culture.  He expressed understanding.  Will proceed with right shoulder arthroscopic irrigation and extensive intra-articular debridement in urgent fashion in order to minimize any further spread of the infection.   I thoroughly explained the risks and benefits as well as the expected postoperative timeline for the proposed procedure versus nonoperative management. Risks of this procedure include but are not limited to bleeding, infection, nerve injury, DVT and failure of repair or implant.  The patient expressed understanding and wished to proceed with surgical intervention.  All questions were answered. We will begin the presurgical process and find them a surgical date in a timely fashion that works for them.    I reviewed their postoperative timeline and plan with them. They understand the postoperative precautions and the treatment plan going forward.     At the end of the visit, all questions were answered in full. The patient is in agreement with the plan and recommendations. They will call the office with any  questions/concerns.    Note dictated with [email or web address] software. Completed without full typed error editing and sent to avoid delay.      SUBJECTIVE  CHIEF COMPLAINT:   Chief Complaint   Patient presents with    Right Shoulder - Post-op        HPI: Clemente Coronado is a 54 y.o. patient who is now 6 weeks postop s/p Right shoulder arthroscopy, rotator cuff repair, subacromial decompression acromioplasty, extensive intra-articular debridement and synovectomy done 12/03/24. He is coming in for another wound check. He states he is having come drainage still. Taking Bactrim and Mobic as directed. He has results come back for his culture which is positive for Cutibacterium acnes.  He continues to have drainage of the shoulder.      1/9/2025 HPI Visit Info:   He returns to clinic for a wound check.  He notes that the drainage has decreased significantly.  He denies any fevers or chills.  The surrounding erythema is also improving.  He is hoping to avoid surgery if at all possible.  Notably, he is on my partner who performed an ultrasound of the area which demonstrates some soft tissue swelling at the rotator cuff which is expected at this point in the recovery process with a possible small pocket of surrounding fluid; he was not able to aspirate any significant volume of fluid, however he was able to send a small amount for culture.  We will monitor this closely.    REVIEW OF SYSTEMS  Constitutional: See HPI for pain assessment, No significant weight loss, recent trauma  Cardiovascular: No chest pain, shortness of breath  Respiratory: No difficulty breathing, cough  Gastrointestinal: No nausea, vomiting, diarrhea, constipation  Musculoskeletal: Noted in HPI, positive for pain, restricted motion, stiffness  Integumentary: No rashes, easy bruising, redness   Neurological: no numbness or tingling in extremities, no gait disturbances   Psychiatric: No mood changes, memory changes, social issues  Heme/Lymph: no  "excessive swelling, easy bruising, excessive bleeding  ENT: No hearing changes  Eyes: No vision changes    CURRENT MEDICATIONS:   Current Outpatient Medications   Medication Sig Dispense Refill    acetaminophen (Tylenol) 500 mg tablet Take 2 tablets (1,000 mg) by mouth every 8 hours if needed for mild pain (1 - 3). 60 tablet 1    aspirin 81 mg EC tablet Take 1 tablet (81 mg) by mouth 2 times a day. (Patient not taking: Reported on 1/6/2025) 15 tablet 0    meloxicam (Mobic) 15 mg tablet Take 1 tablet (15 mg) by mouth once daily. 30 tablet 3    ondansetron (Zofran) 4 mg tablet Take 1 tablet (4 mg) by mouth every 8 hours if needed for nausea or vomiting. 20 tablet 0    sulfamethoxazole-trimethoprim (Bactrim DS) 800-160 mg tablet Take 1 tablet by mouth 2 times a day for 7 days. 14 tablet 0     No current facility-administered medications for this visit.        OBJECTIVE    PHYSICAL EXAM      12/3/2024    12:21 PM 12/3/2024    12:35 PM 12/3/2024    12:55 PM 12/3/2024     1:34 PM 12/16/2024    11:00 AM 1/6/2025     8:30 AM 1/8/2025    10:58 AM   Vitals   Systolic 119 124 122 123      Diastolic 72 59 97 76      Heart Rate 55 73 64 56      Temp 35.5 °C (95.9 °F)  35.5 °C (95.9 °F) 36.2 °C (97.2 °F)      Resp 13 18 18 18      Height     1.905 m (6' 3\") 1.905 m (6' 3\") 1.905 m (6' 3\")   Weight (lb)     216 212 212   BMI     27 kg/m2 26.5 kg/m2 26.5 kg/m2   BSA (m2)     2.28 m2 2.26 m2 2.26 m2   Visit Report     Report Report Report      There is no height or weight on file to calculate BMI.    General: Well-appearing, no acute distress    Skin intact bilateral upper and lower extremities  No erythema  No warmth    Detailed examination of the right shoulder demonstrates:  Surgical incision(s) healing well over posterior and lateral shoulder. Anterior portal incision with persistent slightly cloudy drainage that can be expressed  mild surrounding erythema  No ecchymosis  Resolving swelling  Biceps contour normal  Shoulder range " of motion deferred  Upper extremity motor grossly intact  C5-T1 sensation intact bilaterally  2+ radial pulses bilaterally  Warm and well-perfused, brisk capillary refill    IMAGING:   No new imaging

## 2025-01-13 NOTE — DISCHARGE INSTRUCTIONS
Alfonzo Trevizo M.D.   Sports Medicine Orthopaedic Surgery    [facility]  [address] [address]                  [address] [facility]       [address]  [facility], [address]   Phone: [phone]         Phone: [phone]       Phone: [phone]   Fax: [phone]                         Fax: [phone]       Fax: [phone]     After hours phone number: [phone]    AFTER SURGERY     Anesthesia  If you received a nerve block during surgery, you may have numbness or inability to move the limb. Do not be alarmed as this may last 8-36 hours depending upon the amount and type of medication used by the anesthesiologist. Make sure If you are experiencing numbness after 36 hours, please call the office. When the nerve block begins to wear off, you will feel a tingling sensation, like pins and needles. It is important that you start taking the pain medication at that time to ensure that you “stay ahead of the pain.” It is important to take the pain medicine when the pain level is a 4 or 5/10, before it gets too high.    Do not operate heavy machinery, make major decisions, drink alcohol or smoke for 24 hours. Do not drink alcohol while taking pain medications.    Prescribed Medications   Narcotic pain medicine (Oxycodone): The goal of post-operative pain management is pain control, NOT pain elimination. You should expect some pain after surgery - this pain helps you protect itself while it is healing. Constipation, nausea, itching, and drowsiness are side effects of this type of medication. You should take an over-the-counter stool softener (Colace and/or Senna) while taking narcotics to prevent constipation. If you experience itching, over the counter Benadryl may be helpful. Narcotic pain medications often produce drowsiness and it is against the law to operate a vehicle  while taking these medications. If you are taking oxycodone, you should take acetaminophen (Tylenol) around the clock to decrease baseline pain. Do not take Tylenol-containing products while on Percocet or [place].   Refill Policy: For concerns over your safety due to the rising opioid addiction epidemic in the [place], refills of your narcotic pain medications will only be provided on a case by case basis. Please use these medications judiciously.    Anti-inflammatory (NSAID) medicine (Naproxen or Mobic): These are both anti-inflammatory and pain relief. Do NOT take this medication if you have had an ulcer in the past unless you have cleared this with your primary care doctor. You should take NSAIDs with food or antacid to reduce the chance of upset stomach. Depending on your surgery, Dr. Trevizo may instruct you to avoid these medications.    Anti-nausea medicine (ondansetron/Zofran): sometimes patients experience nausea related to either anesthesia or the narcotic pain medication. If this is the case you will find this medication helpful.    DVT prophylaxis (Aspirin or Eliquis): For most patients, activity alone is sufficient to prevent dangerous blood clots, but in some cases your personal risk profile and/or the type of surgery you have undergone makes it necessary that you take medication to help prevent blood clots. Dr. Trevizo will inform you if you are to start one of these medications postoperatively.    Stool softener (Colace and/or Senna): are available over the counter at your local pharmacy and should be taken while you are taking narcotic pain medication to avoid constipation. You should stop taking these medications if you develop diarrhea. Over the counter laxatives may be used if you develop painful constipation.     Diet   Start with clear liquids (water, juice, Gatorade) and light foods (jello, soup, crackers). Progress to normal diet as tolerated if you are not nauseated. Avoid greasy or spicy  foods for the first 24hrs to avoid GI upset. Increase fluid intake to help prevent constipation.    Dressings / Wound Care  You may remove the outer dressing after 3 days and then can shower. (If you have a splint, please leave the splint in place until follow-up.) Do not remove Steri-strips (white stickers) if present over your incisions. Steri-strips may fall off on their own, which is normal. After the bandage has been removed, you may leave the incisions open to air. Alternatively, if you prefer to keep them covered, you may do so with Band-Aids, a light gauze dressing, or a clean ACE wrap. You may shower after the bandage has been removed (3 days), but it is very important that you pat the wounds dry after the shower. It is OK to allow soap and water to run over the wound - DO NOT soak or scrub the wound. Outside of the shower, keep your incision clean and dry until your first postoperative visit, approximately 10-14 days after surgery. You may remove your sling or brace to shower, unless otherwise instructed. As your balance may be affected by recent surgery, we recommend placing a plastic chair in the shower to help prevent falls. Do NOT take baths, go into a pool, or soak the operative site until approved by Dr. Trevizo.    Bracing / Physical Therapy   If you were given one, make sure you wear sling or brace at all times until your follow-up with Dr. Trevizo. Only remove your sling or brace for physical therapy, home exercises, and hygiene. These are typically used for 4-6 weeks after surgery in order to protect the healing of the tissue.     Physical therapy is just as important to your recovery as the actual operation! If you were given a prescription for physical therapy, make sure you go to your appointments and do your exercises daily at home (especially motion exercises).     Ice is a very important part of your recovery. It helps reduce inflammation and improves pain control. You should ice a few times  each day for 20-30 minutes at a time. Please make sure there is something under the ice (clean towel, cloth, T-shirt) so that the ice doesn't directly contact your skin. If you ordered a commercially available ice machine (optional) and a compression setting is available, you should use LOW or no compression during the first 5 days. After that, you may increase compression setting as tolerated. If the compression is bothering you then do not use compression.    Driving / Travel  Ultimately, it is your judgment to decide when you are safe to drive, but if you are at all unsure, do not risk your life or someone else's. As a general guideline, you will not be able to drive for 4-6 weeks after surgery. You should certainly not drive while on narcotics pain medication or while in a brace or sling.     Avoid flights and long distance traveling for 6 weeks after surgery. It is important to discuss your travel plans with Dr. Trevizo, as additional medications may need to be prescribed to help prevent blood clots if certain travel is unavoidable.     Return to Work or School   Your return to work will depend on what surgery was done and what type of work you do. Please note that these are general guidelines, and there may be modifications based on your unique situation. Typically, you may return to sedentary work or school 3-7 days after surgery if pain is tolerable and you are no longer requiring narcotic pain medication. In conjunction with your input, Dr. Trevizo will determine when you may return to more physically rigorous demands.     If you had Knee or Hip Surgery   If your surgery involves a ligament reconstruction or tendon repair, you will typically be prescribed crutches for at least the first few days until pain and the postoperative protocol allows you to fully bear weight and also wear a brace for 4-6 weeks. If cartilage surgery or meniscus repair is performed, you may be on crutches for 6 weeks. Individual  rehabilitation guidelines will vary based upon the unique situation and surgery of every patient, but take these general guidelines into account when planning return to work.     If you had Shoulder Surgery   If your surgery involves a repair (rotator cuff repair, labral repair), you will have a sling on for six weeks after surgery. If you have a sling, you will need to wear it all day. Please wear the sling at all times except for bathing for the first 2 weeks minimum. As long as you can abide by the restrictions, you can return to work when you feel like you can do so safely. However, you will need to take into consideration driving and activities related to your job. You may be able to safely loosen it if you are able to keep your arm supported. Please understand that you will NOT be able to work with your arm away from your body, above shoulder level, or use your arm against gravity for approximately 8 weeks. For jobs that require physical labor, you may require four months or more to return to work. If your surgery does NOT involve a repair (subacromial decompression, distal clavicle excision, capsular release), then you will be in a sling for only a few days after surgery. When comfortable, you may return to work when ready to conduct normal activities of your job. Remember that you may be on narcotic pain medications and these should be discontinued prior to your return to work. For jobs that require physical labor, you may require 6 weeks or more to return to work.     If you had Elbow or Wrist Surgery   If your surgery involves a repair or reconstruction, you will have a splint and sling on followed by a brace for four to six weeks after surgery. As long as you can abide by the restrictions, you can return to work when you feel like you can do so safely. However, you will need to take into consideration driving and activities related to your job. If you have a sling, you will need to wear it all day unless  otherwise instructed. You may be able to safely loosen it if you are able to keep your arm supported. For jobs that require physical labor, you may require four months or more to return to work.    If you had Ankle Surgery   If your surgery involves a repair or reconstruction, you will have a splint followed by a walking boot for four to six weeks after surgery. As long as you can abide by the restrictions, you can return to work when you feel like you can do so safely. However, you will need to take into consideration driving and activities related to your job. For jobs that require physical labor, you may require four months or more to return to work.    Normal Sensations and Findings after Surgery:   PAIN: We do everything possible to make your pain/discomfort level tolerable, but some amount of pain is to be expected.   WARMTH: Mild warmth around the operative site is normal for up to 3 weeks.   REDNESS: Small amount of redness where the sutures enter the skin is normal. If redness worsens or spreads it is important that you contact the office.   DRAINAGE: A small amount is normal for the first 48-72 hours. If wounds continue to drain after this time (requiring multiple gauze changes per day), please contact the office.   NUMBNESS: Around the incision is common.   BRUISING: Is common and often tracks down the arm or leg due to gravity and results in an alarming appearance, but is common and will resolve with time.   FEVER: Low-grade fevers (less than 101.5°F) are common during the first week after surgery. You should drink plenty of fluids and breathe deeply.   CONSTIPATION: Post-operative pain medications as well as immobility can lead to constipation. Please consider taking an over the counter stool softener such as Colace and/or Senna if you experience constipation or if you have a history of constipation.    Follow-Up   A Follow-up appointment should be arranged for 10-14 days after surgery. If one has not  been provided, please call the office to schedule.       NOTIFY US IMMEDIATELY FOR ANY OF THE FOLLOWING:   Most orthopedic surgical procedures are uneventful. However, complications can occur. The following are things to be aware of in the immediate postoperative period.   FEVER - Temperature rises above 101.5°F or associated chills/sweats   WOUND - If you notice drainage more than 4 days after surgery, if the drainage turns yellows and foul smelling, if you need to change gauze multiple times per day, or if sutures become loose.   CARDIOVASCULAR - Chest pain, shortness of breath, palpitations, or fainting spells must be taken seriously. Go to the emergency room (or call 911) immediately for evaluation.   BLOOD CLOTS - Orthopaedic surgery patients are at risk for blood clots. While the risk is higher for lower extremity surgery, even those who have undergone upper extremity surgery are at an increased risk. Please notify Dr. Trevizo if you or someone in your family has had blood clots or any type of known clotting disorder. Signs of blood clots may include calf pain or cramping, diffuse swelling in the leg and foot, or chest pain and shortness of breath. Please call the office or go to the hospital if you recognize any of these symptoms.   NAUSEA - If you have severe vomiting, diarrhea, or constipation, or cannot keep any liquid down   URINARY RETENTION - If you cannot urinate the night after surgery, please go to the Emergency Room.

## 2025-01-14 ENCOUNTER — ANESTHESIA (OUTPATIENT)
Dept: OPERATING ROOM | Facility: HOSPITAL | Age: 55
End: 2025-01-14
Payer: COMMERCIAL

## 2025-01-14 ENCOUNTER — TREATMENT (OUTPATIENT)
Dept: PHYSICAL THERAPY | Facility: CLINIC | Age: 55
End: 2025-01-14
Payer: COMMERCIAL

## 2025-01-14 ENCOUNTER — TELEPHONE (OUTPATIENT)
Dept: INFECTIOUS DISEASES | Facility: HOSPITAL | Age: 55
End: 2025-01-14

## 2025-01-14 ENCOUNTER — ANESTHESIA EVENT (OUTPATIENT)
Dept: OPERATING ROOM | Facility: HOSPITAL | Age: 55
End: 2025-01-14
Payer: COMMERCIAL

## 2025-01-14 ENCOUNTER — HOSPITAL ENCOUNTER (OUTPATIENT)
Facility: HOSPITAL | Age: 55
Setting detail: OUTPATIENT SURGERY
Discharge: HOME | End: 2025-01-14
Attending: STUDENT IN AN ORGANIZED HEALTH CARE EDUCATION/TRAINING PROGRAM | Admitting: STUDENT IN AN ORGANIZED HEALTH CARE EDUCATION/TRAINING PROGRAM
Payer: COMMERCIAL

## 2025-01-14 VITALS
WEIGHT: 212.74 LBS | OXYGEN SATURATION: 98 % | BODY MASS INDEX: 26.45 KG/M2 | SYSTOLIC BLOOD PRESSURE: 139 MMHG | DIASTOLIC BLOOD PRESSURE: 78 MMHG | HEART RATE: 67 BPM | TEMPERATURE: 97.7 F | HEIGHT: 75 IN | RESPIRATION RATE: 16 BRPM

## 2025-01-14 DIAGNOSIS — M75.101 TEAR OF RIGHT ROTATOR CUFF, UNSPECIFIED TEAR EXTENT, UNSPECIFIED WHETHER TRAUMATIC: ICD-10-CM

## 2025-01-14 DIAGNOSIS — M00.811 ARTHRITIS OF RIGHT SHOULDER DUE TO OTHER BACTERIA: ICD-10-CM

## 2025-01-14 DIAGNOSIS — M65.911 SYNOVITIS OF RIGHT SHOULDER: ICD-10-CM

## 2025-01-14 DIAGNOSIS — M01.X11: Primary | ICD-10-CM

## 2025-01-14 DIAGNOSIS — T81.41XA ABSCESS INVOLVING SUTURE: ICD-10-CM

## 2025-01-14 PROCEDURE — 2500000004 HC RX 250 GENERAL PHARMACY W/ HCPCS (ALT 636 FOR OP/ED): Performed by: ANESTHESIOLOGY

## 2025-01-14 PROCEDURE — 3600000002 HC OR TIME - INITIAL BASE CHARGE - PROCEDURE LEVEL TWO: Performed by: STUDENT IN AN ORGANIZED HEALTH CARE EDUCATION/TRAINING PROGRAM

## 2025-01-14 PROCEDURE — 3600000007 HC OR TIME - EACH INCREMENTAL 1 MINUTE - PROCEDURE LEVEL TWO: Performed by: STUDENT IN AN ORGANIZED HEALTH CARE EDUCATION/TRAINING PROGRAM

## 2025-01-14 PROCEDURE — 2500000001 HC RX 250 WO HCPCS SELF ADMINISTERED DRUGS (ALT 637 FOR MEDICARE OP)

## 2025-01-14 PROCEDURE — 87070 CULTURE OTHR SPECIMN AEROBIC: CPT | Mod: BEALAB

## 2025-01-14 PROCEDURE — 29823 SHO ARTHRS SRG XTNSV DBRDMT: CPT

## 2025-01-14 PROCEDURE — 7100000002 HC RECOVERY ROOM TIME - EACH INCREMENTAL 1 MINUTE: Performed by: STUDENT IN AN ORGANIZED HEALTH CARE EDUCATION/TRAINING PROGRAM

## 2025-01-14 PROCEDURE — 2720000007 HC OR 272 NO HCPCS: Performed by: STUDENT IN AN ORGANIZED HEALTH CARE EDUCATION/TRAINING PROGRAM

## 2025-01-14 PROCEDURE — A4649 SURGICAL SUPPLIES: HCPCS | Performed by: STUDENT IN AN ORGANIZED HEALTH CARE EDUCATION/TRAINING PROGRAM

## 2025-01-14 PROCEDURE — 97110 THERAPEUTIC EXERCISES: CPT | Mod: GP | Performed by: PHYSICAL THERAPIST

## 2025-01-14 PROCEDURE — 3700000002 HC GENERAL ANESTHESIA TIME - EACH INCREMENTAL 1 MINUTE: Performed by: STUDENT IN AN ORGANIZED HEALTH CARE EDUCATION/TRAINING PROGRAM

## 2025-01-14 PROCEDURE — 7100000001 HC RECOVERY ROOM TIME - INITIAL BASE CHARGE: Performed by: STUDENT IN AN ORGANIZED HEALTH CARE EDUCATION/TRAINING PROGRAM

## 2025-01-14 PROCEDURE — 7100000010 HC PHASE TWO TIME - EACH INCREMENTAL 1 MINUTE: Performed by: STUDENT IN AN ORGANIZED HEALTH CARE EDUCATION/TRAINING PROGRAM

## 2025-01-14 PROCEDURE — 2500000005 HC RX 250 GENERAL PHARMACY W/O HCPCS

## 2025-01-14 PROCEDURE — 3700000001 HC GENERAL ANESTHESIA TIME - INITIAL BASE CHARGE: Performed by: STUDENT IN AN ORGANIZED HEALTH CARE EDUCATION/TRAINING PROGRAM

## 2025-01-14 PROCEDURE — 29823 SHO ARTHRS SRG XTNSV DBRDMT: CPT | Performed by: STUDENT IN AN ORGANIZED HEALTH CARE EDUCATION/TRAINING PROGRAM

## 2025-01-14 PROCEDURE — 2500000004 HC RX 250 GENERAL PHARMACY W/ HCPCS (ALT 636 FOR OP/ED)

## 2025-01-14 PROCEDURE — 64415 NJX AA&/STRD BRCH PLXS IMG: CPT | Performed by: ANESTHESIOLOGY

## 2025-01-14 PROCEDURE — A29823 PR SHLDR ARTHROSCOP,EXTEN DEBRIDE: Performed by: NURSE ANESTHETIST, CERTIFIED REGISTERED

## 2025-01-14 PROCEDURE — A29823 PR SHLDR ARTHROSCOP,EXTEN DEBRIDE: Performed by: ANESTHESIOLOGY

## 2025-01-14 PROCEDURE — 97140 MANUAL THERAPY 1/> REGIONS: CPT | Mod: GP | Performed by: PHYSICAL THERAPIST

## 2025-01-14 PROCEDURE — 2500000004 HC RX 250 GENERAL PHARMACY W/ HCPCS (ALT 636 FOR OP/ED): Performed by: NURSE ANESTHETIST, CERTIFIED REGISTERED

## 2025-01-14 PROCEDURE — 7100000009 HC PHASE TWO TIME - INITIAL BASE CHARGE: Performed by: STUDENT IN AN ORGANIZED HEALTH CARE EDUCATION/TRAINING PROGRAM

## 2025-01-14 PROCEDURE — 2500000004 HC RX 250 GENERAL PHARMACY W/ HCPCS (ALT 636 FOR OP/ED): Mod: JZ

## 2025-01-14 RX ORDER — ONDANSETRON HYDROCHLORIDE 2 MG/ML
4 INJECTION, SOLUTION INTRAVENOUS ONCE AS NEEDED
Status: DISCONTINUED | OUTPATIENT
Start: 2025-01-14 | End: 2025-01-14 | Stop reason: HOSPADM

## 2025-01-14 RX ORDER — CEFAZOLIN SODIUM 2 G/100ML
2 INJECTION, SOLUTION INTRAVENOUS ONCE
Status: COMPLETED | OUTPATIENT
Start: 2025-01-14 | End: 2025-01-14

## 2025-01-14 RX ORDER — ONDANSETRON 4 MG/1
4 TABLET, FILM COATED ORAL EVERY 8 HOURS PRN
Qty: 20 TABLET | Refills: 0 | Status: SHIPPED | OUTPATIENT
Start: 2025-01-14 | End: 2025-01-21

## 2025-01-14 RX ORDER — AMOXICILLIN 250 MG/1
500 CAPSULE ORAL ONCE
Status: COMPLETED | OUTPATIENT
Start: 2025-01-14 | End: 2025-01-14

## 2025-01-14 RX ORDER — GABAPENTIN 300 MG/1
300 CAPSULE ORAL ONCE
Status: COMPLETED | OUTPATIENT
Start: 2025-01-14 | End: 2025-01-14

## 2025-01-14 RX ORDER — ACETAMINOPHEN 325 MG/1
975 TABLET ORAL ONCE
Status: COMPLETED | OUTPATIENT
Start: 2025-01-14 | End: 2025-01-14

## 2025-01-14 RX ORDER — MEPERIDINE HYDROCHLORIDE 25 MG/ML
12.5 INJECTION INTRAMUSCULAR; INTRAVENOUS; SUBCUTANEOUS EVERY 10 MIN PRN
Status: DISCONTINUED | OUTPATIENT
Start: 2025-01-14 | End: 2025-01-14 | Stop reason: HOSPADM

## 2025-01-14 RX ORDER — FENTANYL CITRATE 50 UG/ML
50 INJECTION, SOLUTION INTRAMUSCULAR; INTRAVENOUS ONCE
Status: COMPLETED | OUTPATIENT
Start: 2025-01-14 | End: 2025-01-14

## 2025-01-14 RX ORDER — HYDROMORPHONE HYDROCHLORIDE 0.2 MG/ML
0.2 INJECTION INTRAMUSCULAR; INTRAVENOUS; SUBCUTANEOUS EVERY 5 MIN PRN
Status: DISCONTINUED | OUTPATIENT
Start: 2025-01-14 | End: 2025-01-14 | Stop reason: HOSPADM

## 2025-01-14 RX ORDER — HYDRALAZINE HYDROCHLORIDE 20 MG/ML
5 INJECTION INTRAMUSCULAR; INTRAVENOUS EVERY 30 MIN PRN
Status: DISCONTINUED | OUTPATIENT
Start: 2025-01-14 | End: 2025-01-14 | Stop reason: HOSPADM

## 2025-01-14 RX ORDER — ACETAMINOPHEN 500 MG
1000 TABLET ORAL EVERY 8 HOURS PRN
Qty: 60 TABLET | Refills: 1 | Status: SHIPPED | OUTPATIENT
Start: 2025-01-14 | End: 2025-02-03

## 2025-01-14 RX ORDER — IPRATROPIUM BROMIDE 0.5 MG/2.5ML
500 SOLUTION RESPIRATORY (INHALATION) ONCE
Status: DISCONTINUED | OUTPATIENT
Start: 2025-01-14 | End: 2025-01-14 | Stop reason: HOSPADM

## 2025-01-14 RX ORDER — AMOXICILLIN 500 MG/1
500 TABLET, FILM COATED ORAL EVERY 8 HOURS SCHEDULED
Qty: 30 TABLET | Refills: 0 | Status: SHIPPED | OUTPATIENT
Start: 2025-01-14 | End: 2025-01-24

## 2025-01-14 RX ORDER — ALBUTEROL SULFATE 0.83 MG/ML
2.5 SOLUTION RESPIRATORY (INHALATION) ONCE AS NEEDED
Status: DISCONTINUED | OUTPATIENT
Start: 2025-01-14 | End: 2025-01-14 | Stop reason: HOSPADM

## 2025-01-14 RX ORDER — DIPHENHYDRAMINE HYDROCHLORIDE 50 MG/ML
12.5 INJECTION INTRAMUSCULAR; INTRAVENOUS ONCE AS NEEDED
Status: DISCONTINUED | OUTPATIENT
Start: 2025-01-14 | End: 2025-01-14 | Stop reason: HOSPADM

## 2025-01-14 RX ORDER — AMOXICILLIN AND CLAVULANATE POTASSIUM 500; 125 MG/1; MG/1
2 TABLET, FILM COATED ORAL EVERY 8 HOURS SCHEDULED
Status: DISCONTINUED | OUTPATIENT
Start: 2025-01-14 | End: 2025-01-14

## 2025-01-14 RX ORDER — CEPHALEXIN 500 MG/1
500 CAPSULE ORAL 3 TIMES DAILY
COMMUNITY

## 2025-01-14 RX ORDER — FENTANYL CITRATE 50 UG/ML
25 INJECTION, SOLUTION INTRAMUSCULAR; INTRAVENOUS EVERY 5 MIN PRN
Status: DISCONTINUED | OUTPATIENT
Start: 2025-01-14 | End: 2025-01-14 | Stop reason: HOSPADM

## 2025-01-14 RX ORDER — PROPOFOL 10 MG/ML
INJECTION, EMULSION INTRAVENOUS AS NEEDED
Status: DISCONTINUED | OUTPATIENT
Start: 2025-01-14 | End: 2025-01-14

## 2025-01-14 RX ORDER — ONDANSETRON HYDROCHLORIDE 2 MG/ML
INJECTION, SOLUTION INTRAVENOUS AS NEEDED
Status: DISCONTINUED | OUTPATIENT
Start: 2025-01-14 | End: 2025-01-14

## 2025-01-14 RX ORDER — ASPIRIN 81 MG/1
81 TABLET ORAL 2 TIMES DAILY
Qty: 30 TABLET | Refills: 0 | Status: SHIPPED | OUTPATIENT
Start: 2025-01-14 | End: 2025-01-29

## 2025-01-14 RX ORDER — OXYCODONE HYDROCHLORIDE 5 MG/1
5 TABLET ORAL EVERY 4 HOURS PRN
Status: DISCONTINUED | OUTPATIENT
Start: 2025-01-14 | End: 2025-01-14 | Stop reason: HOSPADM

## 2025-01-14 RX ORDER — CELECOXIB 200 MG/1
200 CAPSULE ORAL ONCE
Status: COMPLETED | OUTPATIENT
Start: 2025-01-14 | End: 2025-01-14

## 2025-01-14 RX ORDER — MIDAZOLAM HYDROCHLORIDE 1 MG/ML
2 INJECTION, SOLUTION INTRAMUSCULAR; INTRAVENOUS ONCE
Status: COMPLETED | OUTPATIENT
Start: 2025-01-14 | End: 2025-01-14

## 2025-01-14 RX ORDER — LIDOCAINE HYDROCHLORIDE 10 MG/ML
INJECTION, SOLUTION EPIDURAL; INFILTRATION; INTRACAUDAL; PERINEURAL AS NEEDED
Status: DISCONTINUED | OUTPATIENT
Start: 2025-01-14 | End: 2025-01-14

## 2025-01-14 RX ORDER — OXYCODONE HYDROCHLORIDE 5 MG/1
5 TABLET ORAL EVERY 4 HOURS PRN
Qty: 15 TABLET | Refills: 0 | Status: SHIPPED | OUTPATIENT
Start: 2025-01-14 | End: 2025-01-17

## 2025-01-14 RX ADMIN — ONDANSETRON 4 MG: 2 INJECTION INTRAMUSCULAR; INTRAVENOUS at 17:28

## 2025-01-14 RX ADMIN — AMOXICILLIN 500 MG: 250 CAPSULE ORAL at 18:57

## 2025-01-14 RX ADMIN — PROPOFOL 200 MG: 10 INJECTION, EMULSION INTRAVENOUS at 17:17

## 2025-01-14 RX ADMIN — DEXAMETHASONE SODIUM PHOSPHATE 4 MG: 4 INJECTION, SOLUTION INTRAMUSCULAR; INTRAVENOUS at 17:28

## 2025-01-14 RX ADMIN — POVIDONE-IODINE 1 APPLICATION: 5 SOLUTION TOPICAL at 15:20

## 2025-01-14 RX ADMIN — MIDAZOLAM 2 MG: 1 INJECTION INTRAMUSCULAR; INTRAVENOUS at 15:49

## 2025-01-14 RX ADMIN — CEFAZOLIN SODIUM 2 G: 2 INJECTION, SOLUTION INTRAVENOUS at 17:26

## 2025-01-14 RX ADMIN — ACETAMINOPHEN 975 MG: 325 TABLET, FILM COATED ORAL at 15:29

## 2025-01-14 RX ADMIN — FENTANYL CITRATE 50 MCG: 50 INJECTION INTRAMUSCULAR; INTRAVENOUS at 15:50

## 2025-01-14 RX ADMIN — LIDOCAINE HYDROCHLORIDE 2 ML: 10 INJECTION, SOLUTION EPIDURAL; INFILTRATION; INTRACAUDAL; PERINEURAL at 17:17

## 2025-01-14 RX ADMIN — GABAPENTIN 300 MG: 300 CAPSULE ORAL at 15:30

## 2025-01-14 RX ADMIN — SODIUM CHLORIDE, POTASSIUM CHLORIDE, SODIUM LACTATE AND CALCIUM CHLORIDE: 600; 310; 30; 20 INJECTION, SOLUTION INTRAVENOUS at 17:14

## 2025-01-14 RX ADMIN — CELECOXIB 200 MG: 200 CAPSULE ORAL at 15:29

## 2025-01-14 ASSESSMENT — PAIN - FUNCTIONAL ASSESSMENT
PAIN_FUNCTIONAL_ASSESSMENT: 0-10
PAIN_FUNCTIONAL_ASSESSMENT: FLACC (FACE, LEGS, ACTIVITY, CRY, CONSOLABILITY)
PAIN_FUNCTIONAL_ASSESSMENT: 0-10

## 2025-01-14 ASSESSMENT — PAIN DESCRIPTION - DESCRIPTORS
DESCRIPTORS: ACHING
DESCRIPTORS: ACHING;SORE
DESCRIPTORS: ACHING
DESCRIPTORS: ACHING

## 2025-01-14 ASSESSMENT — PAIN SCALES - GENERAL
PAINLEVEL_OUTOF10: 4
PAIN_LEVEL: 0
PAINLEVEL_OUTOF10: 0 - NO PAIN
PAINLEVEL_OUTOF10: 4
PAINLEVEL_OUTOF10: 0 - NO PAIN
PAINLEVEL_OUTOF10: 4
PAINLEVEL_OUTOF10: 0 - NO PAIN

## 2025-01-14 ASSESSMENT — COLUMBIA-SUICIDE SEVERITY RATING SCALE - C-SSRS
2. HAVE YOU ACTUALLY HAD ANY THOUGHTS OF KILLING YOURSELF?: NO
6. HAVE YOU EVER DONE ANYTHING, STARTED TO DO ANYTHING, OR PREPARED TO DO ANYTHING TO END YOUR LIFE?: NO
1. IN THE PAST MONTH, HAVE YOU WISHED YOU WERE DEAD OR WISHED YOU COULD GO TO SLEEP AND NOT WAKE UP?: NO

## 2025-01-14 NOTE — ANESTHESIA PREPROCEDURE EVALUATION
"Patient: Clemente Coronado    Procedure Information       Date/Time: 01/14/25 1750    Procedure: Right shoulder arthroscopic irrigation, extensive intra-articular debridement and synovectomy (Right: Shoulder)    Location: [facility] / [facility]    Surgeons: Alfonzo Trevizo MD            Relevant Problems   Hematology   (+) Hematologic malignancy (Multi)      ID   (+) Direct infection of right shoulder in infectious and parasitic diseases classified elsewhere (Multi)       Clinical information reviewed:   Tobacco  Allergies  Meds   Med Hx  Surg Hx   Fam Hx  Soc Hx        NPO Detail:  NPO/Void Status  Carbohydrate Drink Given Prior to Surgery? : N  Date of Last Liquid: 01/14/25  Time of Last Liquid: 0800 (0800 8 oz \"protein drink\")  Date of Last Solid: 01/13/25  Time of Last Solid: 1900  Last Intake Type: Clear fluids; Food  Time of Last Void: 1430         PHYSICAL EXAM    Anesthesia Plan    History of general anesthesia?: yes  History of complications of general anesthesia?: no    ASA 2     Anesthetic plan and risks discussed with patient.    General with LMA and neuraxial  "

## 2025-01-14 NOTE — ANESTHESIA PROCEDURE NOTES
Peripheral Block    Patient location during procedure: pre-op  Start time: 1/14/2025 3:53 PM  End time: 1/14/2025 3:55 PM  Reason for block: at surgeon's request and post-op pain management  Staffing  Performed: attending   Authorized by: Emir Madera MD    Performed by: Emir Madera MD  Preanesthetic Checklist  Completed: patient identified, IV checked, site marked, risks and benefits discussed, surgical consent, monitors and equipment checked, pre-op evaluation and timeout performed   Timeout performed at: 1/14/2025 3:49 PM  Peripheral Block  Patient position: laying flat  Prep: ChloraPrep  Patient monitoring: heart rate, cardiac monitor and continuous pulse ox  Block type: interscalene  Injection technique: single-shot  Guidance: ultrasound guided  Needle  Needle type: short-bevel   Needle gauge: 22 G  Needle length: 5 cm  Needle localization: ultrasound guidance     image stored in chart  Test dose: negative  Assessment  Injection assessment: no paresthesia on injection, negative aspiration for heme, incremental injection and local visualized surrounding nerve on ultrasound  Paresthesia pain: none  Heart rate change: no  Slow fractionated injection: yes  Additional Notes  Ropivicaine 20 ml 0.5% with 5 mg decadron preservative free.    Patient able to flex and extend hand post nerve block.    Ultrasound guidance used-- able to visualize needle, Nerve roots and local anesthetic  filling around the nerve roots as it is injected.

## 2025-01-14 NOTE — ANESTHESIA POSTPROCEDURE EVALUATION
Patient: Clemente Coronado    Procedure Summary       Date: 01/14/25 Room / Location: [facility] / [facility]    Anesthesia Start: 1712 Anesthesia Stop: 1825    Procedure: Right shoulder arthroscopic irrigation, extensive intra-articular debridement and synovectomy (Right: Shoulder) Diagnosis:       Abscess involving suture      Synovitis of right shoulder      Direct infection of right shoulder in infectious and parasitic diseases classified elsewhere (Multi)      (Abscess involving suture [T81.41XA])      (Synovitis of right shoulder [M65.911])      (Direct infection of right shoulder in infectious and parasitic diseases classified elsewhere (Multi) [M01.X11])    Surgeons: Alfonzo Trevizo MD Responsible Provider: Renee Arriola MD    Anesthesia Type: general ASA Status: 2            Anesthesia Type: general    Vitals Value Taken Time   /73 01/14/25 1838   Temp 36.3 °C (97.3 °F) 01/14/25 1824   Pulse 62 01/14/25 1838   Resp 16 01/14/25 1838   SpO2 97 % 01/14/25 1838       Anesthesia Post Evaluation    Patient location during evaluation: bedside  Patient participation: complete - patient participated  Level of consciousness: awake and alert  Pain score: 0  Pain management: adequate  Airway patency: patent  Cardiovascular status: hemodynamically stable  Respiratory status: room air  Hydration status: euvolemic  Postoperative Nausea and Vomiting: none        There were no known notable events for this encounter.

## 2025-01-14 NOTE — PROGRESS NOTES
Physical Therapy Treatment    Patient Name: Clemente Coronado  MRN: [id number]  Today's Date: 1/14/2025  Time Calculation  Start Time: 0840  Stop Time: 0920  Time Calculation (min): 40 min     PT Therapeutic Procedures Time Entry  Manual Therapy Time Entry: 9  Therapeutic Exercise Time Entry: 32                 Payor: [employer] [facility] / Plan: [employer] / Product Type: *No Product type* /     Reason for Referral: R shoulder sx-RTC repair 12/3/24  General Comment: Visit 2 of 40    Current Problem:  Problem List Items Addressed This Visit             ICD-10-CM    Tear of right rotator cuff M75.101       Subjective   States that he still has an infection in his shoulder. He is going into the OR to have it flushed out. As a result of the infection he is really sore today. He was cleared to come to PT.   He is out of the sling today. Yesterday was the first day he was out of it. He has been sleeping with sling prn.     HEP compliance: yes     Pain:  Pain Assessment: 0-10  0-10 (Numeric) Pain Score: 4  Pain location: R shoulder     Precautions:  Precautions  Precautions Comment: Post op protocol, CA      Objective   No objective measures taken this visit    Treatment:  4 weeks post op 12/31/24  6 weeks post op 1/14/25  8 weeks post op 1/28/25     Per protocol   PHASE II (Weeks 6 - 8):   Sling: Discontinue at 6 weeks  Range of Motion: Progress PROM and begin AAROM and progress slowly   Exercise: Continue Phase I; No shoulder strengthening  If concomitant DCE performed-NO horizontal adduction until 8 weeks postoperatively   If concomitant biceps tenodesis performed-NO active biceps strengthening until 8 weeks postoperatively  Modalities: Per therapist, including electrical stimulation, ultrasound, heat (before), ice (after)     PHASE III (Weeks 8 - 12):   Sling: None  Range of Motion: Begin AROM in all planes and progress slowly  Exercise: Begin isometric exercises (use pillow or folded towel without moving the  shoulder); no shoulder resistance exercises until 12 weeks after surgery   Okay for active biceps strengthening at 8 weeks postoperatively  Modalities: Per therapist, including electrical stimulation, ultrasound, heat (before), ice (after)     PHASE IV (Weeks 12 - 24):  Sling: None  Range of Motion: Progress to full, painless, AROM  Exercise: Progress Phase II and III exercises, begin gentle resistance exercises with elastic band or hand weights, including resisted scapular strengthening, rotator cuff strengthening, and deltoid strengthening   Begin functional sport/work specific exercises at 4 months    Therapeutic exercise  Supine AAROM flexion with L hand holding R wrist x 10   Supine ER AAROM with dowel 2x10  Seated shoulder flexion AAROM -scooting stool back 2x10  Pulleys   Standing bicep curl 3 po with eccentric control x 10 ea   Theraweb red flex and ext x 1 min   Wrist ext 3 lb 2x10  Wrist flexion 3 lb 2x10  Wrist extension 3 lbs 2x10  Wrist radial deviation 2x10    Update HEP 1/16/25    Manual   PROM R shoulder flexion, abd, ER     Modalities      HEP  Access Code: [email or web address]  URL: [email or web address]  Date: 01/06/2025  Prepared by: Sarah Chan    Exercises  - Circular Shoulder Pendulum with Table Support  - 2 x daily - 7 x weekly - 2-3 sets - 10 reps  - Flexion-Extension Shoulder Pendulum with Table Support  - 2 x daily - 7 x weekly - 2-3 sets - 10 reps  - Seated Scapular Retraction  - 2 x daily - 7 x weekly - 2-3 sets - 10 reps  - Seated Elbow Flexion and Extension AROM  - 2 x daily - 7 x weekly - 2-3 sets - 10 reps  - Seated Gripping Towel  - 2 x daily - 7 x weekly - 2-3 sets - 10 reps  - Wrist Flexion Extension AROM - Palms Down  - 2 x daily - 7 x weekly - 2-3 sets - 10 reps    LAD R shoulder with passive shoulder flexion in tolerable range     Assessment  VCs provided to control R arm descend with L arm to avoid increased pain. Pt tolerated wrist exercises well.      Plan  Continue to progress POC as tolerated by patient to improve strength, mobility and overall function    Goals:  Active       PT Problem       Reduce pain at worst to less than or = 2/10 with all functional and recreational activity.        Start:  01/06/25    Expected End:  04/06/25            Increase ROM/flexibility to WFL to perform daily functional activities including dressing/bathing/grooming tasks and other household and work related tasks        Start:  01/06/25    Expected End:  04/06/25            Increase by > or = 1/2 mm grade to improve lifting/carrying household and work items to perform daily tasks without increased pain/compensation         Start:  01/06/25    Expected End:  04/06/25            Decrease Quick Dash score by > or = 8 points        Start:  01/06/25    Expected End:  04/06/25            Patient will demonstrate independence in home program for support of progression       Start:  01/06/25    Expected End:  04/06/25

## 2025-01-14 NOTE — PERIOPERATIVE NURSING NOTE
Dr. Trevizo at bedside. Ordered Amoxicillin doses for now and in am. Pt tolerates pain and po well.

## 2025-01-14 NOTE — ANESTHESIA PROCEDURE NOTES
Airway  Date/Time: 1/14/2025 5:18 PM  Urgency: elective    Airway not difficult    Staffing  Performed: CRNA   Authorized by: Renee Arriola MD    Performed by: JUDY Pepper-ANDREIA  Patient location during procedure: OR    Indications and Patient Condition  Indications for airway management: anesthesia  Spontaneous ventilation: present  Sedation level: deep  Preoxygenated: yes  Patient position: sniffing  Mask difficulty assessment: 0 - not attempted  No planned trial extubation    Final Airway Details  Final airway type: supraglottic airway      Successful airway: classic  Size 5     Number of attempts at approach: 1

## 2025-01-15 NOTE — BRIEF OP NOTE
Date: 2025  OR Location: [facility]    Name: Clemente Coronado, : 1970, Age: 54 y.o., MRN: [id number], Sex: male    Diagnosis  Pre-op Diagnosis      * Abscess involving suture [T81.41XA]     * Synovitis of right shoulder [M65.911]     * Direct infection of right shoulder in infectious and parasitic diseases classified elsewhere (Multi) [M01.X11] Post-op Diagnosis     * Abscess involving suture [T81.41XA]     * Synovitis of right shoulder [M65.911]     * Direct infection of right shoulder in infectious and parasitic diseases classified elsewhere (Multi) [M01.X11]     Procedures  1.  Right shoulder arthroscopic irrigation, extensive intra-articular debridement, synovectomy.    Surgeons      * Alfonzo Trevizo - Primary    Resident/Fellow/Other Assistant:  Surgeons and Role:     * Gabrielle Lopresti, PA-C - [employer]    Staff:   Circulator: Angie Bearden Person: Shavon Taveras Circulator: Marcie  Relief Circulator: Nina Taveras Scrub: Marcie    Anesthesia Staff: Anesthesiologist: Renee Arriola MD  CRNA: Arthur Chen APRN-CRNA  C-AA: ELIZABETH Reddy    Procedure Summary  Anesthesia: General  ASA: II  Estimated Blood Loss: 5 mL  Intra-op Medications:   Administrations occurring from 1750 to 193 on 25:   Medication Name Total Dose   amoxicillin (Amoxil) capsule 500 mg 500 mg   LR bolus Cannot be calculated              Anesthesia Record               Intraprocedure I/O Totals          Intake    LR bolus 700.00 mL    ceFAZolin (Ancef) 2 g in dextrose (iso)  mL 100.00 mL    Total Intake 800 mL          Specimen:   ID Type Source Tests Collected by Time   A : RIGHT ANTERIOR SHOULDER 1 Swab ABSCESS TISSUE/WOUND CULTURE/SMEAR Alfonzo Trevizo MD 2025   B : RIGHT ANTERIOR SHOULDER 2 Swab ABSCESS TISSUE/WOUND CULTURE/SMEAR Alfonzo Trevizo MD 2025   C : RIGHT ANTERIOR SHOULDER 3 Swab ABSCESS TISSUE/WOUND CULTURE/SMEAR Alfonzo Trevizo MD 2025      Findings: Extensive  infection of the right shoulder involving the glenohumeral joint and subacromial space with drainage out of the lateral portal site    Complications:  None; patient tolerated the procedure well.     Disposition: PACU - hemodynamically stable.  Condition: stable  Specimens Collected:   ID Type Source Tests Collected by Time   A : RIGHT ANTERIOR SHOULDER 1 Swab ABSCESS TISSUE/WOUND CULTURE/SMEAR Alfonzo Trevizo MD 1/14/2025 1738   B : RIGHT ANTERIOR SHOULDER 2 Swab ABSCESS TISSUE/WOUND CULTURE/SMEAR Alfonzo Trevizo MD 1/14/2025 1738   C : RIGHT ANTERIOR SHOULDER 3 Swab ABSCESS TISSUE/WOUND CULTURE/SMEAR Alfonzo Trevizo MD 1/14/2025 1738     Attending Attestation: I performed the procedure.    Alfonzo Trevizo  Phone Number: [phone]

## 2025-01-15 NOTE — PROGRESS NOTES
Physical Therapy Treatment    Patient Name: Clemente Coronado  MRN: [id number]  Today's Date: 1/16/2025  Time Calculation  Start Time: 1228  Stop Time: 1307  Time Calculation (min): 39 min     PT Therapeutic Procedures Time Entry  Manual Therapy Time Entry: 30  Therapeutic Exercise Time Entry: 9                 Payor: [employer] [facility] / Plan: [employer] / Product Type: *No Product type* /     Reason for Referral: R shoulder sx-RTC repair 12/3/24  General Comment: Visit 3 of 40    Current Problem:  Problem List Items Addressed This Visit             ICD-10-CM    Tear of right rotator cuff M75.101         Subjective   Pt reports more soreness today. He is seeing infectious disease.     S/p Right shoulder arthroscopic irrigation, extensive intra-articular debridement, synovectomy 1/14/25.     Per op report:  The rotator cuff was inspected from the acromial side which demonstrated a retear of the entirety of the superior rotator cuff tear. The prior suture material was all removed nonabsorbable.     Postoperative instructions: The patient will remain in a shoulder sling with an abduction pillow at all times except for bathing. The patient will return to see me in 1 week for a routine postoperative visit. They will continue working through PT according to the PT protocol appropriate for their surgery. I also discussed this with our infectious disease colleagues. They instructed me to start him on amoxicillin 500 mg 3 times daily which was started immediately postoperatively. He will continue on this for the foreseeable future until he sees infectious disease this week. He has an appointment to see our infectious disease team this Thursday.     HEP compliance: yes     Pain:  Pain Assessment: 0-10  0-10 (Numeric) Pain Score: 4  Pain location: R shoulder     Precautions:  Precautions  Precautions Comment: Post op protocol, CA      Objective   PROM R shoulder flexion: 150    Treatment:  4 weeks post op  12/31/24  6 weeks post op 1/14/25  8 weeks post op 1/28/25     Per protocol   PHASE II (Weeks 6 - 8):   Sling: Discontinue at 6 weeks  Range of Motion: Progress PROM and begin AAROM and progress slowly   Exercise: Continue Phase I; No shoulder strengthening  If concomitant DCE performed-NO horizontal adduction until 8 weeks postoperatively   If concomitant biceps tenodesis performed-NO active biceps strengthening until 8 weeks postoperatively  Modalities: Per therapist, including electrical stimulation, ultrasound, heat (before), ice (after)     PHASE III (Weeks 8 - 12):   Sling: None  Range of Motion: Begin AROM in all planes and progress slowly  Exercise: Begin isometric exercises (use pillow or folded towel without moving the shoulder); no shoulder resistance exercises until 12 weeks after surgery   Okay for active biceps strengthening at 8 weeks postoperatively  Modalities: Per therapist, including electrical stimulation, ultrasound, heat (before), ice (after)     PHASE IV (Weeks 12 - 24):  Sling: None  Range of Motion: Progress to full, painless, AROM  Exercise: Progress Phase II and III exercises, begin gentle resistance exercises with elastic band or hand weights, including resisted scapular strengthening, rotator cuff strengthening, and deltoid strengthening   Begin functional sport/work specific exercises at 4 months    Therapeutic exercise  Supine AAROM flexion with L hand holding R wrist x 10   Supine ER AAROM with dowel 2x10  Seated shoulder flexion and abd AAROM -scooting stool back 2x10  Pulleys-next visit   Standing bicep curl 3 po with eccentric control x 10 ea   Theraweb red flex and ext x 1 min   Wrist ext 3 lb 2x10  Wrist flexion 3 lb 2x10  Wrist extension 3 lbs 2x10  Wrist radial deviation 2x10    Update HEP 1/16/25    Manual   R shoulder LAD, PROM R shoulder flexion, abd, ER     Modalities      HEP  Access Code: [email or web address]  URL: [email or web address]  Date:  01/16/2025  Prepared by: Sarah Chan    Exercises  - Circular Shoulder Pendulum with Table Support  - 2 x daily - 7 x weekly - 2-3 sets - 10 reps  - Flexion-Extension Shoulder Pendulum with Table Support  - 2 x daily - 7 x weekly - 2-3 sets - 10 reps  - Seated Scapular Retraction  - 2 x daily - 7 x weekly - 2-3 sets - 10 reps  - Seated Elbow Flexion and Extension AROM  - 2 x daily - 7 x weekly - 2-3 sets - 10 reps  - Seated Gripping Towel  - 2 x daily - 7 x weekly - 2-3 sets - 10 reps  - Supine Shoulder Flexion AAROM (Mirrored)  - 2 x daily - 7 x weekly - 2 sets - 10 reps  - Seated Shoulder Flexion Slide at Table Top with Forearm in Neutral  - 2 x daily - 7 x weekly - 2 sets - 10 reps  - Seated Shoulder Abduction Towel Slide at Table Top  - 2 x daily - 7 x weekly - 2 sets - 10 reps    Assessment  Pt reported feeling good stretch with PROM and stretching. Demonstrates good ROM and good tolerance with AAROM. Pt to see infectious disease today.     Plan  Continue to progress POC as tolerated by patient to improve strength, mobility and overall function    Goals:  Active       PT Problem       Reduce pain at worst to less than or = 2/10 with all functional and recreational activity.        Start:  01/06/25    Expected End:  04/06/25            Increase ROM/flexibility to WFL to perform daily functional activities including dressing/bathing/grooming tasks and other household and work related tasks        Start:  01/06/25    Expected End:  04/06/25            Increase by > or = 1/2 mm grade to improve lifting/carrying household and work items to perform daily tasks without increased pain/compensation         Start:  01/06/25    Expected End:  04/06/25            Decrease Quick Dash score by > or = 8 points        Start:  01/06/25    Expected End:  04/06/25            Patient will demonstrate independence in home program for support of progression       Start:  01/06/25    Expected End:  04/06/25

## 2025-01-15 NOTE — OP NOTE
Right shoulder arthroscopic irrigation, extensive intra-articular debridement and synovectomy (R) Operative Note     Date: 2025  OR Location: [facility]    Name: Clemente Coronado, : 1970, Age: 54 y.o., MRN: [id number], Sex: male    Diagnosis  Pre-op Diagnosis      * Abscess involving suture [T81.41XA]     * Synovitis of right shoulder [M65.911]     * Direct infection of right shoulder in infectious and parasitic diseases classified elsewhere (Multi) [M01.X11] Post-op Diagnosis     * Abscess involving suture [T81.41XA]     * Synovitis of right shoulder [M65.911]     * Direct infection of right shoulder in infectious and parasitic diseases classified elsewhere (Multi) [M01.X11]     Procedures  1.  Right shoulder arthroscopic irrigation and extensive intra-articular debridement, synovectomy    Surgeons      * Alfonzo Trevizo - Primary    Resident/Fellow/Other Assistant:  Surgeons and Role:     * Gabrielle Lopresti, PA-C - [employer]    Staff:   Circulator: Angie Bearden Person: Shavon Taveras Circulator: Marcie  Relief Circulator: Nina Taveras Scrub: Marcie    Anesthesia Staff: Anesthesiologist: Renee Arriola MD  CRNA: Arthur Chen APRN-CRNA  C-AA: ELIZABETH Reddy    Procedure Summary  Anesthesia: General  ASA: II  Estimated Blood Loss: 5 mL  Intra-op Medications:   Administrations occurring from 1750 to 1930 on 25:   Medication Name Total Dose   amoxicillin (Amoxil) capsule 500 mg 500 mg   LR bolus Cannot be calculated              Anesthesia Record               Intraprocedure I/O Totals          Intake    LR bolus 700.00 mL    ceFAZolin (Ancef) 2 g in dextrose (iso)  mL 100.00 mL    Total Intake 800 mL          Specimen:   ID Type Source Tests Collected by Time   A : RIGHT ANTERIOR SHOULDER 1 Swab ABSCESS TISSUE/WOUND CULTURE/SMEAR Alfonzo Trevizo MD 2025   B : RIGHT ANTERIOR SHOULDER 2 Swab ABSCESS TISSUE/WOUND CULTURE/SMEAR Alfonzo Trevizo MD 2025   C : RIGHT  ANTERIOR SHOULDER 3 Swab ABSCESS TISSUE/WOUND CULTURE/SMEAR Alfonzo Trevizo MD 1/14/2025 4980                 Drains and/or Catheters: * None in log *    Tourniquet Times:         Implants: None    Findings: Extensive infection of the right shoulder involving the glenohumeral joint and subacromial space with drainage out of the lateral portal site     Indications: Clemente Coronado is an 54 y.o. male who is having surgery for right shoulder postsurgical infection after prior rotator cuff repair with confirmed cutibacterium acnes on aspiration.  Initially this was thought to be a superficial suture abscess or a small superficial infection, however he continued to have persistent drainage despite initial conservative management with a small local debridement and removal of the suture material in the office and treatment with oral antibiotics (Bactrim and Keflex).  However, he had persistent drainage and ultrasound-guided aspiration by my partner yielded very minimal fluid which was able to be sent for culture.  Culture yielded a small amount of Cutibacterium acnes.  Once this resulted, the patient was seen in the office and a long discussion was had regarding the treatment options going forward which at this point required an arthroscopic irrigation and debridement to clear the shoulder of the confirmed cutibacterium acnes infection.  The patient was added on to the surgical schedule urgently and was completed the following day.  Again, this was suspected to be at most a superficial infection, but based on the above with persistent drainage and a confirmed aspiration of Cutibacterium acnes he was indicated for surgical intervention of form of a right shoulder arthroscopic irrigation, debridement and synovectomy.  I thoroughly explained the risks and benefits as well as the expected postoperative timeline for the proposed procedure versus nonoperative management. Risks of this procedure include but are not limited to  bleeding, infection, nerve injury, DVT and failure of repair or implant.  The patient expressed understanding and wished to proceed with surgical intervention.  All questions were answered. They were consented to the above procedure at bedside.    The patient was seen in the preoperative area. The risks, benefits, complications, treatment options, non-operative alternatives, expected recovery and outcomes were discussed with the patient. The possibilities of reaction to medication, pulmonary aspiration, injury to surrounding structures, bleeding, recurrent infection, the need for additional procedures, failure to diagnose a condition, and creating a complication requiring transfusion or operation were discussed with the patient. The patient concurred with the proposed plan, giving informed consent.  The site of surgery was properly noted/marked if necessary per policy. The patient has been actively warmed in preoperative area.  Given that the patient was already on oral antibiotics, preoperative antibiotics were not held and have been ordered and given within 1 hours of incision. Venous thrombosis prophylaxis have been ordered including bilateral sequential compression devices    Procedure Details:   The patient was seen in the preoperative holding area where the correct site and side were signed my initials. The patient was seen by the anesthesia team and a preoperative regional anesthetic block was administered.  The patient was brought back to the operating room after smooth induction of LMA anesthesia the patient was placed in the beach chair position.  All bony prominences were padded.  The patient was belted and posted.  SCD boots were placed on bilateral lower extremities.  The contralateral arm was placed in a well-padded arm epps.  The operative arm was prepped and draped in usual sterile fashion including our standard hydrogen peroxide and alcohol prescrub/wash followed by ChloraPrep x 2 and placed in  the pneumatic arm epps.  Prior to the start of the procedure, preoperative antibiotics were administered by the anesthesia team.  Prior to incision, a preoperative time-out was performed confirming the correct patient, side, site and procedure to be performed.  All in the room were in agreement.    We began the procedure by opening the anterior lateral portal which was the one where the persistent drainage was coming from.  The prior sutures were removed and a Metzenbaum scissor was used to open the subcutaneous tissue deep to the portal.  This opened a pocket of fairly significant volume of cloudy purulent like material which was expressed out of this lateral portal.  This was fairly extensive in volume.  This area was cultured with swab x 3.  Once this was thoroughly drained with is much of the purulent material expressed out of the wound as possible, we proceeded with the arthroscopic portion of the procedure.    We began the arthroscopic portion of the procedure with the standard posterior portal to the shoulder.  Local anesthetic was administered at the posterior portal site and the posterior portal was established with a 11. Blade through the skin.  The arthroscope was introduced atraumatically into the glenohumeral joint. An anterior portal was established using an outside in technique with spinal needle.  The skin was incised with a #11 blade and a threaded cannula was inserted atraumatically. Diagnostic arthroscopy was performed demonstrating infection extending into the glenohumeral joint.  There was no significant damage to the chondral surfaces which were intact with minimal degenerative change.  There was extensive synovitis throughout the glenohumeral joint and subacromial space.  Unfortunately, the prior superior rotator cuff repair was not healed likely related to the infection.  However, the prior subscapularis repair appeared to be intact.    An extensive intra-articular debridement and  synovectomy was performed in the anterior superior and posterior aspects of the glenohumeral joint, glenoid, glenoid labrum, humeral head, rotator cuff, middle glenohumeral ligament and capsular tissue with a combination of arthroscopic shaver and ablator.    At this point the arthroscope was removed and inserted into the subacromial space.  A lateral portal was established using outside in technique with a spinal needle followed by a sharp incision through the skin with a #11 blade and the arthroscopic trocar to dilate the portal.  There was extensive bursitis, and thus an extensive bursectomy was performed using a combination of the arthroscopic shaver and arthroscopic ablator.  The coracoacromial ligament was elevated off of the anterior acromion revealing an anterior acromial spur.  Using a bone cutting shaver, the subacromial spur was removed back to a flat surface completing the acromioplasty.  The rotator cuff was inspected from the acromial side which demonstrated a retear of the entirety of the superior rotator cuff tear.  The prior suture material was all removed nonabsorbable.    An extensive irrigation and excisional debridement was performed of the glenohumeral joint tissue as well as the subacromial space tissue, rotator cuff and subdeltoid space as well as each portal to ensure that there was no further localized areas of infection.  A total of 18 L was irrigated through the shoulder throughout the arthroscopy portion of the case.    Arthroscopic photos were taken throughout.  Excess arthroscopic fluid was drained from the shoulder and the arthroscopic instruments were removed.      The wounds were thoroughly irrigated.  Portals were closed with interrupted 3-0 nylon sutures and dressed with Xeroform, dry gauze and Tegaderms.  The operative arm was placed in a new simple shoulder sling.    At the end of the procedure all needle, lap and instrument counts were correct.    The patient was woken from  anesthesia and transferred to the recovery room in stable condition.  The patient tolerated the procedure well.    I was present for all critical portions of this case.    Postoperative instructions:  The patient will remain in a shoulder sling with an abduction pillow at all times except for bathing.  The patient will return to see me in 1 week for a routine postoperative visit. They will continue working through PT according to the PT protocol appropriate for their surgery.  I also discussed this with our infectious disease colleagues.  They instructed me to start him on amoxicillin 500 mg 3 times daily which was started immediately postoperatively.  He will continue on this for the foreseeable future until he sees infectious disease this week.  He has an appointment to see our infectious disease team this Thursday.    Gabrielle LoPresti, PA-C was present for the entire case.  Her assistance was necessary and critical to the successful completion of the procedure.  She provided skilled assistance with arm positioning, arthroscope manipulation, retraction and wound closure.  A qualified assistant was not available to perform her portion of the case.    Complications:  None; patient tolerated the procedure well.    Disposition: PACU - hemodynamically stable.  Condition: stable     Attending Attestation: I performed the procedure.    Alfonzo Trevizo  Phone Number: [phone]

## 2025-01-16 ENCOUNTER — TREATMENT (OUTPATIENT)
Dept: PHYSICAL THERAPY | Facility: CLINIC | Age: 55
End: 2025-01-16
Payer: COMMERCIAL

## 2025-01-16 DIAGNOSIS — M75.101 TEAR OF RIGHT ROTATOR CUFF, UNSPECIFIED TEAR EXTENT, UNSPECIFIED WHETHER TRAUMATIC: ICD-10-CM

## 2025-01-16 PROCEDURE — 97110 THERAPEUTIC EXERCISES: CPT | Mod: GP | Performed by: PHYSICAL THERAPIST

## 2025-01-16 PROCEDURE — 97140 MANUAL THERAPY 1/> REGIONS: CPT | Mod: GP | Performed by: PHYSICAL THERAPIST

## 2025-01-16 ASSESSMENT — PAIN SCALES - GENERAL: PAINLEVEL_OUTOF10: 4

## 2025-01-16 ASSESSMENT — PAIN - FUNCTIONAL ASSESSMENT: PAIN_FUNCTIONAL_ASSESSMENT: 0-10

## 2025-01-19 LAB
BACTERIA SPEC CULT: ABNORMAL
GRAM STN SPEC: ABNORMAL

## 2025-01-20 ENCOUNTER — INFUSION (OUTPATIENT)
Dept: INFUSION THERAPY | Facility: HOSPITAL | Age: 55
End: 2025-01-20
Payer: COMMERCIAL

## 2025-01-20 VITALS
DIASTOLIC BLOOD PRESSURE: 94 MMHG | RESPIRATION RATE: 18 BRPM | SYSTOLIC BLOOD PRESSURE: 153 MMHG | OXYGEN SATURATION: 99 % | TEMPERATURE: 97 F | HEART RATE: 71 BPM

## 2025-01-20 DIAGNOSIS — M01.X11: Primary | ICD-10-CM

## 2025-01-20 LAB
BACTERIA SPEC CULT: ABNORMAL
GRAM STN SPEC: ABNORMAL

## 2025-01-20 PROCEDURE — 2500000004 HC RX 250 GENERAL PHARMACY W/ HCPCS (ALT 636 FOR OP/ED): Performed by: STUDENT IN AN ORGANIZED HEALTH CARE EDUCATION/TRAINING PROGRAM

## 2025-01-20 PROCEDURE — 96365 THER/PROPH/DIAG IV INF INIT: CPT | Mod: INF

## 2025-01-20 RX ADMIN — DALBAVANCIN 1000 MG: 500 INJECTION, POWDER, FOR SOLUTION INTRAVENOUS at 11:10

## 2025-01-20 ASSESSMENT — ENCOUNTER SYMPTOMS
DEPRESSION: 0
LOSS OF SENSATION IN FEET: 0
OCCASIONAL FEELINGS OF UNSTEADINESS: 0

## 2025-01-20 ASSESSMENT — PATIENT HEALTH QUESTIONNAIRE - PHQ9
1. LITTLE INTEREST OR PLEASURE IN DOING THINGS: NOT AT ALL
SUM OF ALL RESPONSES TO PHQ9 QUESTIONS 1 AND 2: 0
2. FEELING DOWN, DEPRESSED OR HOPELESS: NOT AT ALL

## 2025-01-20 ASSESSMENT — PAIN SCALES - GENERAL: PAINLEVEL_OUTOF10: 4

## 2025-01-21 ENCOUNTER — TREATMENT (OUTPATIENT)
Dept: PHYSICAL THERAPY | Facility: CLINIC | Age: 55
End: 2025-01-21
Payer: COMMERCIAL

## 2025-01-21 DIAGNOSIS — M75.101 TEAR OF RIGHT ROTATOR CUFF, UNSPECIFIED TEAR EXTENT, UNSPECIFIED WHETHER TRAUMATIC: ICD-10-CM

## 2025-01-21 PROCEDURE — 97140 MANUAL THERAPY 1/> REGIONS: CPT | Mod: GP | Performed by: PHYSICAL THERAPIST

## 2025-01-21 PROCEDURE — 97110 THERAPEUTIC EXERCISES: CPT | Mod: GP | Performed by: PHYSICAL THERAPIST

## 2025-01-21 ASSESSMENT — PAIN SCALES - GENERAL: PAINLEVEL_OUTOF10: 3

## 2025-01-21 ASSESSMENT — PAIN - FUNCTIONAL ASSESSMENT: PAIN_FUNCTIONAL_ASSESSMENT: 0-10

## 2025-01-21 NOTE — PROGRESS NOTES
Physical Therapy Treatment    Patient Name: Clemente Coronado  MRN: [id number]  Today's Date: 1/21/2025  Time Calculation  Start Time: 1331  Stop Time: 1400  Time Calculation (min): 29 min     PT Therapeutic Procedures Time Entry  Manual Therapy Time Entry: 19  Therapeutic Exercise Time Entry: 10                 Payor: [employer] [facility] / Plan: [employer] / Product Type: *No Product type* /     Reason for Referral: R shoulder sx-RTC repair 12/3/24  General Comment: Visit 4 of 40    Current Problem:  Problem List Items Addressed This Visit             ICD-10-CM    Tear of right rotator cuff M75.101           Subjective   Tx per infectious disease picc just a once weekly infusion via peripheral IV  Pt reports that his shoulder feels better than it did.   15 min late due to issues getting out of garage     HEP compliance: yes     Pain:  Pain Assessment: 0-10  0-10 (Numeric) Pain Score: 3  Pain location: R shoulder     Precautions:  Precautions  Precautions Comment: Post op protocol, [place]      Objective   PROM R shoulder flexion: 150 (1/16/25)  No new measurements today.     Treatment:  4 weeks post op 12/31/24  6 weeks post op 1/14/25  8 weeks post op 1/28/25     Per protocol     PHASE III (Weeks 8 - 12):   Sling: None  Range of Motion: Begin AROM in all planes and progress slowly  Exercise: Begin isometric exercises (use pillow or folded towel without moving the shoulder); no shoulder resistance exercises until 12 weeks after surgery   Okay for active biceps strengthening at 8 weeks postoperatively  Modalities: Per therapist, including electrical stimulation, ultrasound, heat (before), ice (after)     PHASE IV (Weeks 12 - 24):  Sling: None  Range of Motion: Progress to full, painless, AROM  Exercise: Progress Phase II and III exercises, begin gentle resistance exercises with elastic band or hand weights, including resisted scapular strengthening, rotator cuff strengthening, and deltoid strengthening   Begin  functional sport/work specific exercises at 4 months    Therapeutic exercise  Tricep isometric 3 sec 2x10  Supine ER/IR iso with PT pressure 3 sec hold x 10   Supine shoulder ext with towel 3 sec hold x 10   Supine flexion with dowel x 10     Held:   Supine AAROM flexion with L hand holding R wrist x 10   Supine ER AAROM with dowel 2x10  Seated shoulder flexion and abd AAROM -scooting stool back 2x10  Pulleys-next visit   Standing bicep curl 3 po with eccentric control x 10 ea   Theraweb red flex and ext x 1 min   Wrist ext 3 lb 2x10  Wrist flexion 3 lb 2x10  Wrist extension 3 lbs 2x10  Wrist radial deviation 2x10    Manual   R shoulder LAD, PROM R shoulder flexion, abd, ER   Trigger point release to infraspinatus and UT on R     Modalities      HEP  Access Code: [email or web address]  URL: [email or web address]  Date: 01/16/2025  Prepared by: Sarah Chan    Exercises  - Circular Shoulder Pendulum with Table Support  - 2 x daily - 7 x weekly - 2-3 sets - 10 reps  - Flexion-Extension Shoulder Pendulum with Table Support  - 2 x daily - 7 x weekly - 2-3 sets - 10 reps  - Seated Scapular Retraction  - 2 x daily - 7 x weekly - 2-3 sets - 10 reps  - Seated Elbow Flexion and Extension AROM  - 2 x daily - 7 x weekly - 2-3 sets - 10 reps  - Seated Gripping Towel  - 2 x daily - 7 x weekly - 2-3 sets - 10 reps  - Supine Shoulder Flexion AAROM (Mirrored)  - 2 x daily - 7 x weekly - 2 sets - 10 reps  - Seated Shoulder Flexion Slide at Table Top with Forearm in Neutral  - 2 x daily - 7 x weekly - 2 sets - 10 reps  - Seated Shoulder Abduction Towel Slide at Table Top  - 2 x daily - 7 x weekly - 2 sets - 10 reps    Assessment  Pt responds well to PROM. Reported feeling a good stretch particularly in tricep region. Palpable tenderness over infraspinatus belly and UT. He did have some bleeding through gauze today upon inspections. Did not appear to be suspicious for any serious drainage. He reported he was going to re  bandage at home     Plan  Continue to progress POC as tolerated by patient to improve strength, mobility and overall function    Goals:  Active       PT Problem       Reduce pain at worst to less than or = 2/10 with all functional and recreational activity.        Start:  01/06/25    Expected End:  04/06/25            Increase ROM/flexibility to WFL to perform daily functional activities including dressing/bathing/grooming tasks and other household and work related tasks        Start:  01/06/25    Expected End:  04/06/25            Increase by > or = 1/2 mm grade to improve lifting/carrying household and work items to perform daily tasks without increased pain/compensation         Start:  01/06/25    Expected End:  04/06/25            Decrease Quick Dash score by > or = 8 points        Start:  01/06/25    Expected End:  04/06/25            Patient will demonstrate independence in home program for support of progression       Start:  01/06/25    Expected End:  04/06/25

## 2025-01-22 ENCOUNTER — APPOINTMENT (OUTPATIENT)
Dept: ORTHOPEDIC SURGERY | Age: 55
End: 2025-01-22
Payer: COMMERCIAL

## 2025-01-22 DIAGNOSIS — M01.X11: ICD-10-CM

## 2025-01-22 DIAGNOSIS — M75.101 TEAR OF RIGHT ROTATOR CUFF, UNSPECIFIED TEAR EXTENT, UNSPECIFIED WHETHER TRAUMATIC: Primary | ICD-10-CM

## 2025-01-22 PROCEDURE — 99024 POSTOP FOLLOW-UP VISIT: CPT

## 2025-01-22 PROCEDURE — 1036F TOBACCO NON-USER: CPT

## 2025-01-22 NOTE — PROGRESS NOTES
PRIMARY CARE PHYSICIAN: Shakeel Coronado DO    ORTHOPAEDIC POSTOPERATIVE VISIT    ASSESSMENT & PLAN    Impression: 54 y.o. male 7 weeks postop s/p Right shoulder arthroscopy, rotator cuff repair, subacromial decompression acromioplasty, extensive intra-articular debridement and synovectomy done 12/03/24 and 1 week status post Right shoulder arthroscopic irrigation, extensive intra-articular debridement and synovectomy done 1/14/25.     Plan:   Overall Clemente is improving. Pain is controlled. His incisions appear to be healing well on exam today. Anterior sutures left in place. No current purulent drainage, erythema, warmth or fluctuance. He will continue with his IV antibiotics per ID. We discussed his post-operative precautions and he expressed understanding. He will keep his incisions clean and dry and ice/rest the shoulder as he needs. He will follow-up in one week for repeat wound check and suture removal.     I reviewed their postoperative timeline and plan with them. They understand the postoperative precautions and the treatment plan going forward.     At the end of the visit, all questions were answered in full. The patient is in agreement with the plan and recommendations. They will call the office with any questions/concerns.    Note dictated with Matchbox software. Completed without full typed error editing and sent to avoid delay.      SUBJECTIVE  CHIEF COMPLAINT: Post-op       HPI: Clemente Coronado is a 54 y.o. patient who is now 7 weeks postop s/p Right shoulder arthroscopy, rotator cuff repair, subacromial decompression acromioplasty, extensive intra-articular debridement and synovectomy done 12/03/24 and 1 week status post Right shoulder arthroscopic irrigation, extensive intra-articular debridement and synovectomy done 1/14/25. Overall Clemente is doing fine. He states he has some bloody drainage out of the anterior portal incision but denies any purulent drainage. He is currently  undergoing antibiotic infusions per infectious disease. Pain is controlled at this time. No fevers/chills. Still working with physical therapy. Discontinued with sling.       REVIEW OF SYSTEMS  Constitutional: See HPI for pain assessment, No significant weight loss, recent trauma  Cardiovascular: No chest pain, shortness of breath  Respiratory: No difficulty breathing, cough  Gastrointestinal: No nausea, vomiting, diarrhea, constipation  Musculoskeletal: Noted in HPI, positive for pain, restricted motion, stiffness  Integumentary: No rashes, easy bruising, redness   Neurological: no numbness or tingling in extremities, no gait disturbances   Psychiatric: No mood changes, memory changes, social issues  Heme/Lymph: no excessive swelling, easy bruising, excessive bleeding  ENT: No hearing changes  Eyes: No vision changes    CURRENT MEDICATIONS:   Current Outpatient Medications   Medication Sig Dispense Refill    acetaminophen (Tylenol) 500 mg tablet Take 2 tablets (1,000 mg) by mouth every 8 hours if needed for mild pain (1 - 3) for up to 20 days. (Patient not taking: Reported on 1/20/2025) 60 tablet 1    amoxicillin (Amoxil) 500 mg tablet Take 1 tablet (500 mg) by mouth every 8 hours for 10 days. (Patient not taking: Reported on 1/20/2025) 30 tablet 0    aspirin 81 mg EC tablet Take 1 tablet (81 mg) by mouth 2 times a day. (Patient not taking: Reported on 1/20/2025) 15 tablet 0    aspirin 81 mg EC tablet Take 1 tablet (81 mg) by mouth 2 times a day for 15 days. 30 tablet 0    cephalexin (Keflex) 500 mg capsule Take 1 capsule (500 mg) by mouth 3 times a day.      meloxicam (Mobic) 15 mg tablet Take 1 tablet (15 mg) by mouth once daily. (Patient not taking: Reported on 1/20/2025) 30 tablet 3     No current facility-administered medications for this visit.        OBJECTIVE    PHYSICAL EXAM      1/14/2025     6:38 PM 1/14/2025     6:45 PM 1/14/2025     6:59 PM 1/14/2025     7:15 PM 1/20/2025    10:10 AM 1/20/2025    11:49  AM 1/20/2025    12:15 PM   Vitals   Systolic 132 136 135 139 154 164 153   Diastolic 73 78 81 78 96 96 94   Heart Rate 62 68 68 67 75 72 71   Temp  36.2 °C (97.2 °F) 36.3 °C (97.3 °F) 36.5 °C (97.7 °F) 36.1 °C (97 °F)     Resp 16 16 16 16 18 18 18      There is no height or weight on file to calculate BMI.    General: Well-appearing, no acute distress    Skin intact bilateral upper and lower extremities  No erythema  No warmth    Detailed examination of the right shoulder demonstrates:  Surgical incision(s) healing well with slight sanguineous drainage from anterior portal incision. Posterior portal incision healing well and sutures removed from this site. Anterior sutures left in place. No current purulent drainage, erythema, or fluctuance.   No ecchymosis  Resolving swelling  Biceps contour normal  Shoulder range of motion deferred  Upper extremity motor grossly intact  C5-T1 sensation intact bilaterally  2+ radial pulses bilaterally  Warm and well-perfused, brisk capillary refill    IMAGING:   No new imaging

## 2025-01-23 ENCOUNTER — TREATMENT (OUTPATIENT)
Dept: PHYSICAL THERAPY | Facility: CLINIC | Age: 55
End: 2025-01-23
Payer: COMMERCIAL

## 2025-01-23 DIAGNOSIS — M75.101 TEAR OF RIGHT ROTATOR CUFF, UNSPECIFIED TEAR EXTENT, UNSPECIFIED WHETHER TRAUMATIC: ICD-10-CM

## 2025-01-23 PROCEDURE — 97110 THERAPEUTIC EXERCISES: CPT | Mod: GP | Performed by: PHYSICAL THERAPIST

## 2025-01-23 PROCEDURE — 97140 MANUAL THERAPY 1/> REGIONS: CPT | Mod: GP | Performed by: PHYSICAL THERAPIST

## 2025-01-23 ASSESSMENT — PAIN - FUNCTIONAL ASSESSMENT: PAIN_FUNCTIONAL_ASSESSMENT: 0-10

## 2025-01-23 ASSESSMENT — PAIN SCALES - GENERAL: PAINLEVEL_OUTOF10: 3

## 2025-01-23 NOTE — PROGRESS NOTES
Physical Therapy Treatment    Patient Name: Clemente Coronado  MRN: [id number]  Today's Date: 1/23/2025  Time Calculation  Start Time: 1448  Stop Time: 1531  Time Calculation (min): 43 min     PT Therapeutic Procedures Time Entry  Manual Therapy Time Entry: 13  Therapeutic Exercise Time Entry: 30                 Payor: [employer] [facility] / Plan: [employer] / Product Type: *No Product type* /     Reason for Referral: R shoulder sx-RTC repair 12/3/24  General Comment: Visit 5 of 40    Current Problem:  Problem List Items Addressed This Visit             ICD-10-CM    Tear of right rotator cuff M75.101             Subjective   Pt had follow up with ortho.   He reports some stitches were removed  Follow up with MD Monday    HEP compliance: yes     Pain:  Pain Assessment: 0-10  0-10 (Numeric) Pain Score: 3  Pain location: R shoulder     Precautions:  Precautions  Precautions Comment: Post op protocol, [place]      Objective   PROM R shoulder flexion: 150 (1/16/25)  No new measurements today.     Treatment:  4 weeks post op 12/31/24  6 weeks post op 1/14/25  8 weeks post op 1/28/25      Therapeutic exercise  Pulleys flexion, abduction x 2 min ea   Towel IR modified 1.5 min   Pt doing isometrics at home ER/IR/EXT/FLEX  Supine abd iso 3 sec x 10  SL ER mid range with PT support 2x10  Bent shoulder flexion 2x10  Tricep OTB 2x10  Doorway pec stretch arms down 30 sec x 2     Next visit:   Rows  Pulldowns  Isometric with band   Scaption  Modified abduction     Manual   R shoulder LAD, PROM R shoulder flexion, abd, ER   Trigger point release to infraspinatus and UT on R       HEP  [email or web address]  Access Code: [email or web address]  URL: [email or web address]  Date: 01/23/2025  Prepared by: Sarah Chan    Exercises  - Circular Shoulder Pendulum with Table Support  - 2 x daily - 7 x weekly - 2-3 sets - 10 reps  - Flexion-Extension Shoulder Pendulum with Table Support  - 2 x daily - 7 x weekly - 2-3 sets - 10 reps  -  Seated Scapular Retraction  - 2 x daily - 7 x weekly - 2-3 sets - 10 reps  - Supine Shoulder Flexion AAROM (Mirrored)  - 2 x daily - 7 x weekly - 2 sets - 10 reps  - Seated Shoulder Flexion Slide at Table Top with Forearm in Neutral  - 2 x daily - 7 x weekly - 2 sets - 10 reps  - Seated Shoulder Abduction Towel Slide at Table Top  - 2 x daily - 7 x weekly - 2 sets - 10 reps  - Isometric Shoulder Abduction at Wall  - 1 x daily - 3-4 x weekly - 2 sets - 10 reps - 5 seconds hold  - Isometric Shoulder Extension at Wall  - 1 x daily - 3-4 x weekly - 2 sets - 10 reps - 5 seconds hold  - Isometric Shoulder Flexion at Wall  - 1 x daily - 3-4 x weekly - 2 sets - 10 reps - 5 seconds hold  - Standing Isometric Shoulder Internal Rotation with Towel Roll at Doorway  - 1 x daily - 3-4 x weekly - 2 sets - 10 reps - 5 seconds hold  - Standing Isometric Shoulder External Rotation with Doorway and Towel Roll  - 1 x daily - 3-4 x weekly - 2 sets - 10 reps - 5 seconds hold  - Sidelying Shoulder External Rotation  - 1 x daily - 3-4 x weekly - 2-3 sets - 10 reps    Assessment  Pt eager to progress and understandably so as he needs to return to work. He has been doing isometrics at home. Discussed natural course of tendon healing. Overall progressing nicely especially considering infection setback    Plan  Continue to progress POC as tolerated by patient to improve strength, mobility and overall function    Goals:  Active       PT Problem       Reduce pain at worst to less than or = 2/10 with all functional and recreational activity.        Start:  01/06/25    Expected End:  04/06/25            Increase ROM/flexibility to WFL to perform daily functional activities including dressing/bathing/grooming tasks and other household and work related tasks        Start:  01/06/25    Expected End:  04/06/25            Increase by > or = 1/2 mm grade to improve lifting/carrying household and work items to perform daily tasks without increased  pain/compensation         Start:  01/06/25    Expected End:  04/06/25            Decrease Quick Dash score by > or = 8 points        Start:  01/06/25    Expected End:  04/06/25            Patient will demonstrate independence in home program for support of progression       Start:  01/06/25    Expected End:  04/06/25

## 2025-01-27 ENCOUNTER — INFUSION (OUTPATIENT)
Dept: INFUSION THERAPY | Facility: HOSPITAL | Age: 55
End: 2025-01-27
Payer: COMMERCIAL

## 2025-01-27 ENCOUNTER — APPOINTMENT (OUTPATIENT)
Dept: ORTHOPEDIC SURGERY | Facility: CLINIC | Age: 55
End: 2025-01-27
Payer: COMMERCIAL

## 2025-01-27 VITALS
DIASTOLIC BLOOD PRESSURE: 84 MMHG | RESPIRATION RATE: 18 BRPM | OXYGEN SATURATION: 99 % | HEART RATE: 62 BPM | TEMPERATURE: 96 F | SYSTOLIC BLOOD PRESSURE: 145 MMHG

## 2025-01-27 VITALS — HEIGHT: 75 IN | BODY MASS INDEX: 26.36 KG/M2 | WEIGHT: 212 LBS

## 2025-01-27 DIAGNOSIS — M01.X11: ICD-10-CM

## 2025-01-27 DIAGNOSIS — M01.X11: Primary | ICD-10-CM

## 2025-01-27 PROCEDURE — 1036F TOBACCO NON-USER: CPT | Performed by: STUDENT IN AN ORGANIZED HEALTH CARE EDUCATION/TRAINING PROGRAM

## 2025-01-27 PROCEDURE — 99024 POSTOP FOLLOW-UP VISIT: CPT | Performed by: STUDENT IN AN ORGANIZED HEALTH CARE EDUCATION/TRAINING PROGRAM

## 2025-01-27 PROCEDURE — 3008F BODY MASS INDEX DOCD: CPT | Performed by: STUDENT IN AN ORGANIZED HEALTH CARE EDUCATION/TRAINING PROGRAM

## 2025-01-27 PROCEDURE — 96365 THER/PROPH/DIAG IV INF INIT: CPT | Mod: INF

## 2025-01-27 PROCEDURE — 2500000004 HC RX 250 GENERAL PHARMACY W/ HCPCS (ALT 636 FOR OP/ED): Performed by: STUDENT IN AN ORGANIZED HEALTH CARE EDUCATION/TRAINING PROGRAM

## 2025-01-27 RX ADMIN — DALBAVANCIN 500 MG: 500 INJECTION, POWDER, FOR SOLUTION INTRAVENOUS at 12:52

## 2025-01-27 ASSESSMENT — ENCOUNTER SYMPTOMS
LOSS OF SENSATION IN FEET: 0
OCCASIONAL FEELINGS OF UNSTEADINESS: 0
DEPRESSION: 0

## 2025-01-27 NOTE — PROGRESS NOTES
PRIMARY CARE PHYSICIAN: Shakeel Coronado DO    ORTHOPAEDIC POSTOPERATIVE VISIT    ASSESSMENT & PLAN    Impression: 55 y.o. male 8 weeks postop s/p Right shoulder arthroscopy, rotator cuff repair, subacromial decompression acromioplasty, extensive intra-articular debridement and synovectomy done 12/03/24 and 2 week status post Right shoulder arthroscopic irrigation, extensive intra-articular debridement and synovectomy done 1/14/25.     Plan:   Overall Clemente is improving.  He has been started on his antibiotic regimen under the guidance of infectious disease.  He states that his fevers and pain in the shoulder have nearly completely resolved.  He is very happy with his progress thus far as he feels like he is improving significantly.  He has no complaints at this time.  He will continue working with physical therapy through the postoperative protocol for the above.  He understands his precautions.  He is planning to return to work mid February which I believe is reasonable at this time.  He will start with a lighter workload and then ramp up as he tolerates.  He will return to see me in 2 weeks for repeat evaluation.    I reviewed their postoperative timeline and plan with them. They understand the postoperative precautions and the treatment plan going forward.     At the end of the visit, all questions were answered in full. The patient is in agreement with the plan and recommendations. They will call the office with any questions/concerns.    Note dictated with [employer] software. Completed without full typed error editing and sent to avoid delay.      SUBJECTIVE  CHIEF COMPLAINT: Post-op       HPI: Clemente Coronado is a 55 y.o. patient who is now 8 weeks postop s/p Right shoulder arthroscopy, rotator cuff repair, subacromial decompression acromioplasty, extensive intra-articular debridement and synovectomy done 12/03/24 and 2 week status post Right shoulder arthroscopic irrigation, extensive  intra-articular debridement and synovectomy done 1/14/25. He states that he is doing well. Pain is well controlled.  No further drainage from his incision.  He has been started on his antibiotic infusion under the guidance of infectious disease.      HPI from 1/22/25  Overall Clemente is doing fine. He states he has some bloody drainage out of the anterior portal incision but denies any purulent drainage. He is currently undergoing antibiotic infusions per infectious disease. Pain is controlled at this time. No fevers/chills. Still working with physical therapy. Discontinued with sling.       REVIEW OF SYSTEMS  Constitutional: See HPI for pain assessment, No significant weight loss, recent trauma  Cardiovascular: No chest pain, shortness of breath  Respiratory: No difficulty breathing, cough  Gastrointestinal: No nausea, vomiting, diarrhea, constipation  Musculoskeletal: Noted in HPI, positive for pain, restricted motion, stiffness  Integumentary: No rashes, easy bruising, redness   Neurological: no numbness or tingling in extremities, no gait disturbances   Psychiatric: No mood changes, memory changes, social issues  Heme/Lymph: no excessive swelling, easy bruising, excessive bleeding  ENT: No hearing changes  Eyes: No vision changes    CURRENT MEDICATIONS:   Current Outpatient Medications   Medication Sig Dispense Refill    acetaminophen (Tylenol) 500 mg tablet Take 2 tablets (1,000 mg) by mouth every 8 hours if needed for mild pain (1 - 3) for up to 20 days. (Patient not taking: Reported on 1/20/2025) 60 tablet 1    aspirin 81 mg EC tablet Take 1 tablet (81 mg) by mouth 2 times a day. (Patient not taking: Reported on 1/20/2025) 15 tablet 0    aspirin 81 mg EC tablet Take 1 tablet (81 mg) by mouth 2 times a day for 15 days. 30 tablet 0    cephalexin (Keflex) 500 mg capsule Take 1 capsule (500 mg) by mouth 3 times a day.      meloxicam (Mobic) 15 mg tablet Take 1 tablet (15 mg) by mouth once daily. (Patient not  taking: Reported on 1/20/2025) 30 tablet 3     No current facility-administered medications for this visit.        OBJECTIVE    PHYSICAL EXAM      1/14/2025     6:38 PM 1/14/2025     6:45 PM 1/14/2025     6:59 PM 1/14/2025     7:15 PM 1/20/2025    10:10 AM 1/20/2025    11:49 AM 1/20/2025    12:15 PM   Vitals   Systolic 132 136 135 139 154 164 153   Diastolic 73 78 81 78 96 96 94   Heart Rate 62 68 68 67 75 72 71   Temp  36.2 °C (97.2 °F) 36.3 °C (97.3 °F) 36.5 °C (97.7 °F) 36.1 °C (97 °F)     Resp 16 16 16 16 18 18 18      There is no height or weight on file to calculate BMI.    General: Well-appearing, no acute distress    Skin intact bilateral upper and lower extremities  No erythema  No warmth    Detailed examination of the right shoulder demonstrates:  Surgical incision(s) healing well, sutures removed  No drainage  No ecchymosis  Trace residual resolving soft tissue swelling  Biceps contour normal  Shoulder range of motion: Forward flexion to 90, ER to neutral  Upper extremity motor grossly intact  C5-T1 sensation intact bilaterally  2+ radial pulses bilaterally  Warm and well-perfused, brisk capillary refill    IMAGING:   No new imaging

## 2025-01-27 NOTE — PROGRESS NOTES
Physical Therapy Treatment    Patient Name: Clemente Coronado  MRN: [id number]  Today's Date: 1/28/2025  Time Calculation  Start Time: 0918  Stop Time: 1000  Time Calculation (min): 42 min     PT Therapeutic Procedures Time Entry  Manual Therapy Time Entry: 10  Therapeutic Exercise Time Entry: 32                 Payor: [employer] [facility] / Plan: [employer] / Product Type: *No Product type* /     Reason for Referral: R shoulder sx-RTC repair 12/3/24  General Comment: Visit 6 of 40    Current Problem:  Problem List Items Addressed This Visit             ICD-10-CM    Tear of right rotator cuff M75.101               Subjective   Pt had MD follow up yesterday. Reports some soreness. States all stitches have been removed. One more antibiotic tx.     HEP compliance: yes     Pain:  Pain Assessment: 0-10  0-10 (Numeric) Pain Score: 3  Pain location: R shoulder     Precautions:  Precautions  Precautions Comment: Post op protocol, [place]      Objective   PROM R shoulder flexion: 150 (1/16/25)  No new measurements today.     Treatment:  8 weeks post op 1/28/25    Therapeutic exercise  Pulleys flexion, abduction, IR 1.5 min ea   Pt doing isometrics at home ER/IR/EXT/FLEX  Supine abd iso 3 sec x 10  SL ER mid range with PT support 2x10  Bent shoulder flexion 2x10  Tricep OTB 2x10  Doorway pec stretch arms down 30 sec x 2     Rows GTB 2x10  Pulldowns GTB 2x10  Isometric with band ER/IR OTB x 10   BSB modified in chair Y, T x 10  BSB modified in chair rows   Scaption x 10     SL shoulder flexion x 10   SL shoulder abd x 10   SL shoulder er x 10     UPDATE HEP 1/30/25    Manual   R shoulder LAD, PROM R shoulder flexion, abd, ER   Trigger point release to infraspinatus and UT on R       HEP  [email or web address]  Access Code: [email or web address]  URL: [email or web address]  Date: 01/23/2025  Prepared by: Sarah Chan    Exercises  - Circular Shoulder Pendulum with Table Support  - 2 x daily - 7 x weekly - 2-3 sets - 10  reps  - Flexion-Extension Shoulder Pendulum with Table Support  - 2 x daily - 7 x weekly - 2-3 sets - 10 reps  - Seated Scapular Retraction  - 2 x daily - 7 x weekly - 2-3 sets - 10 reps  - Supine Shoulder Flexion AAROM (Mirrored)  - 2 x daily - 7 x weekly - 2 sets - 10 reps  - Seated Shoulder Flexion Slide at Table Top with Forearm in Neutral  - 2 x daily - 7 x weekly - 2 sets - 10 reps  - Seated Shoulder Abduction Towel Slide at Table Top  - 2 x daily - 7 x weekly - 2 sets - 10 reps  - Isometric Shoulder Abduction at Wall  - 1 x daily - 3-4 x weekly - 2 sets - 10 reps - 5 seconds hold  - Isometric Shoulder Extension at Wall  - 1 x daily - 3-4 x weekly - 2 sets - 10 reps - 5 seconds hold  - Isometric Shoulder Flexion at Wall  - 1 x daily - 3-4 x weekly - 2 sets - 10 reps - 5 seconds hold  - Standing Isometric Shoulder Internal Rotation with Towel Roll at Doorway  - 1 x daily - 3-4 x weekly - 2 sets - 10 reps - 5 seconds hold  - Standing Isometric Shoulder External Rotation with Doorway and Towel Roll  - 1 x daily - 3-4 x weekly - 2 sets - 10 reps - 5 seconds hold  - Sidelying Shoulder External Rotation  - 1 x daily - 3-4 x weekly - 2-3 sets - 10 reps    Assessment  Pt demonstrates much improved tolerance with pulleys. He tolerated exercise progressions well. Pt did have R shoulder hike with scaption.      Plan  Continue to progress POC as tolerated by patient to improve strength, mobility and overall function    Goals:  Active       PT Problem       Reduce pain at worst to less than or = 2/10 with all functional and recreational activity.        Start:  01/06/25    Expected End:  04/06/25            Increase ROM/flexibility to WFL to perform daily functional activities including dressing/bathing/grooming tasks and other household and work related tasks        Start:  01/06/25    Expected End:  04/06/25            Increase by > or = 1/2 mm grade to improve lifting/carrying household and work items to perform daily  tasks without increased pain/compensation         Start:  01/06/25    Expected End:  04/06/25            Decrease Quick Dash score by > or = 8 points        Start:  01/06/25    Expected End:  04/06/25            Patient will demonstrate independence in home program for support of progression       Start:  01/06/25    Expected End:  04/06/25

## 2025-01-28 ENCOUNTER — TREATMENT (OUTPATIENT)
Dept: PHYSICAL THERAPY | Facility: CLINIC | Age: 55
End: 2025-01-28
Payer: COMMERCIAL

## 2025-01-28 DIAGNOSIS — M75.101 TEAR OF RIGHT ROTATOR CUFF, UNSPECIFIED TEAR EXTENT, UNSPECIFIED WHETHER TRAUMATIC: ICD-10-CM

## 2025-01-28 PROCEDURE — 97110 THERAPEUTIC EXERCISES: CPT | Mod: GP | Performed by: PHYSICAL THERAPIST

## 2025-01-28 PROCEDURE — 97140 MANUAL THERAPY 1/> REGIONS: CPT | Mod: GP | Performed by: PHYSICAL THERAPIST

## 2025-01-28 ASSESSMENT — PAIN SCALES - GENERAL: PAINLEVEL_OUTOF10: 3

## 2025-01-28 ASSESSMENT — PAIN - FUNCTIONAL ASSESSMENT: PAIN_FUNCTIONAL_ASSESSMENT: 0-10

## 2025-01-29 ENCOUNTER — OFFICE VISIT (OUTPATIENT)
Dept: PRIMARY CARE | Facility: CLINIC | Age: 55
End: 2025-01-29
Payer: COMMERCIAL

## 2025-01-29 VITALS
BODY MASS INDEX: 26.61 KG/M2 | SYSTOLIC BLOOD PRESSURE: 159 MMHG | OXYGEN SATURATION: 100 % | WEIGHT: 214 LBS | DIASTOLIC BLOOD PRESSURE: 99 MMHG | HEIGHT: 75 IN | HEART RATE: 65 BPM

## 2025-01-29 DIAGNOSIS — E88.89 HOFFA'S FAT PAD DISEASE (MULTI): ICD-10-CM

## 2025-01-29 DIAGNOSIS — N40.0 BENIGN PROSTATIC HYPERPLASIA, UNSPECIFIED WHETHER LOWER URINARY TRACT SYMPTOMS PRESENT: ICD-10-CM

## 2025-01-29 DIAGNOSIS — M01.X11: ICD-10-CM

## 2025-01-29 DIAGNOSIS — E55.9 VITAMIN D DEFICIENCY: ICD-10-CM

## 2025-01-29 DIAGNOSIS — M00.9 JOINT INFECTION (MULTI): ICD-10-CM

## 2025-01-29 DIAGNOSIS — I10 HYPERTENSION, ESSENTIAL: ICD-10-CM

## 2025-01-29 DIAGNOSIS — Z00.00 ENCOUNTER FOR HEALTH MAINTENANCE EXAMINATION: Primary | ICD-10-CM

## 2025-01-29 DIAGNOSIS — M48.061 SPINAL STENOSIS OF LUMBAR REGION WITHOUT NEUROGENIC CLAUDICATION: ICD-10-CM

## 2025-01-29 DIAGNOSIS — C83.79: ICD-10-CM

## 2025-01-29 DIAGNOSIS — R39.198 SLOWING OF URINARY STREAM: ICD-10-CM

## 2025-01-29 DIAGNOSIS — M47.812 SPONDYLOSIS WITHOUT MYELOPATHY OR RADICULOPATHY, CERVICAL REGION: ICD-10-CM

## 2025-01-29 DIAGNOSIS — E29.1 HYPOGONADISM MALE: ICD-10-CM

## 2025-01-29 PROBLEM — M19.012 PRIMARY OSTEOARTHRITIS OF LEFT SHOULDER: Status: ACTIVE | Noted: 2023-09-02

## 2025-01-29 PROBLEM — C96.9 HEMATOLOGIC MALIGNANCY (MULTI): Status: RESOLVED | Noted: 2024-12-03 | Resolved: 2025-01-29

## 2025-01-29 PROBLEM — M51.17 INTERVERTEBRAL DISC DISORDER WITH RADICULOPATHY OF LUMBOSACRAL REGION: Status: ACTIVE | Noted: 2022-11-22

## 2025-01-29 PROBLEM — M75.102 ROTATOR CUFF TEAR, LEFT: Status: ACTIVE | Noted: 2023-09-02

## 2025-01-29 PROBLEM — J38.3 LESION OF TRUE VOCAL CORD: Status: ACTIVE | Noted: 2019-04-08

## 2025-01-29 PROCEDURE — 1036F TOBACCO NON-USER: CPT | Performed by: FAMILY MEDICINE

## 2025-01-29 PROCEDURE — 3080F DIAST BP >= 90 MM HG: CPT | Performed by: FAMILY MEDICINE

## 2025-01-29 PROCEDURE — 99396 PREV VISIT EST AGE 40-64: CPT | Performed by: FAMILY MEDICINE

## 2025-01-29 PROCEDURE — 3077F SYST BP >= 140 MM HG: CPT | Performed by: FAMILY MEDICINE

## 2025-01-29 PROCEDURE — 3008F BODY MASS INDEX DOCD: CPT | Performed by: FAMILY MEDICINE

## 2025-01-29 RX ORDER — LISINOPRIL 20 MG/1
20 TABLET ORAL DAILY
Qty: 90 TABLET | Refills: 3 | Status: SHIPPED | OUTPATIENT
Start: 2025-01-29 | End: 2026-03-05

## 2025-01-29 NOTE — PROGRESS NOTES
Annual Comprehensive Medical Exam    55 y.o. male presents for annual comprehensive medical evaluation and preventive services screening.  No recent hospitalizations or significant injuries.    HPI  Right shoulder surgery 12/3/24 and 1/14/25.  Currently on IV dalbafancin q wk - one more Infusion to complete 3 wks.    No overt concerns.  Home and infusion center -150 systolic.  Family h/o HTN    BPH syptoms beginning to become more noticeable.  Nocturia twice/night.    Exercises regularly;  multiple vitamin/mineral supplement, Vit C supplement, Vit E, Lopez+Mag, Omega 3    Generalized osteoarthritis is managed with exercise, stretching and Mobic.  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    Colon cancer screening       Past Medical History:   Diagnosis Date    Lymphoma 2005    OA (osteoarthritis)     Rotator cuff syndrome       Past Surgical History:   Procedure Laterality Date    ARTHROSCOPY SHOULDER W/ OPEN ROTATOR CUFF REPAIR Bilateral     KNEE ARTHROPLASTY      KNEE ARTHROSCOPY W/ DEBRIDEMENT Bilateral     8 total knee arthroscopies    ROTATOR CUFF REPAIR      SHOULDER ARTHROSCOPY W/ ROTATOR CUFF REPAIR Right 12/2024    complicated by infection.  reoperated 1/2025    TONSILLECTOMY      TOTAL KNEE ARTHROPLASTY Left 2023     Family History   Problem Relation Name Age of Onset    Other (HTN) Mother      Hypothyroidism Mother      Other (HTN) Father      Benign prostatic hyperplasia Father      Other (head and neck cancer) Brother          Jimmy.  HPV      Social History     Socioeconomic History    Marital status:      Spouse name: Park    Number of children: 4    Years of education: 20    Highest education level: Professional school degree (e.g., MD, DDS, DVM, MCIHAEL)   Occupational History    Not on file   Tobacco Use    Smoking status: Never    Smokeless tobacco: Never   Vaping Use    Vaping status: Not on file   Substance and Sexual Activity    Alcohol  "use: Yes     Comment: Socially    Drug use: Not Currently    Sexual activity: Not on file   Other Topics Concern    Not on file   Social History Narrative    Not on file     Social Drivers of Health     Financial Resource Strain: Not on file   Food Insecurity: Not on file   Transportation Needs: Not on file   Physical Activity: Not on file   Stress: Not on file   Social Connections: Not on file   Intimate Partner Violence: Not on file   Housing Stability: Not on file       Current Outpatient Medications on File Prior to Visit   Medication Sig Dispense Refill    meloxicam (Mobic) 15 mg tablet Take 1 tablet (15 mg) by mouth once daily. 30 tablet 3    [DISCONTINUED] acetaminophen (Tylenol) 500 mg tablet Take 2 tablets (1,000 mg) by mouth every 8 hours if needed for mild pain (1 - 3) for up to 20 days. (Patient not taking: Reported on 1/20/2025) 60 tablet 1    [DISCONTINUED] amoxicillin (Amoxil) 500 mg tablet Take 1 tablet (500 mg) by mouth every 8 hours for 10 days. (Patient not taking: Reported on 1/20/2025) 30 tablet 0    [DISCONTINUED] aspirin 81 mg EC tablet Take 1 tablet (81 mg) by mouth 2 times a day. (Patient not taking: Reported on 1/20/2025) 15 tablet 0    [DISCONTINUED] aspirin 81 mg EC tablet Take 1 tablet (81 mg) by mouth 2 times a day for 15 days. (Patient not taking: Reported on 1/29/2025) 30 tablet 0    [DISCONTINUED] cephalexin (Keflex) 500 mg capsule Take 1 capsule (500 mg) by mouth 3 times a day. (Patient not taking: Reported on 1/29/2025)       No current facility-administered medications on file prior to visit.       No Known Allergies      Review of Systems:  Complete review of systems is negative today except for that mentioned in the history of present illness.  In particular patient denies chest pain, shortness of breath, headaches and GI disturbances.      Visit Vitals  BP (!) 159/99   Pulse 65   Ht 1.905 m (6' 3\")   Wt 97.1 kg (214 lb)   SpO2 100%   BMI 26.75 kg/m²   Smoking Status Never   BSA " "2.27 m²      Vitals:    01/29/25 1209 01/29/25 1213   BP: (!) 163/101 (!) 159/99   Pulse: 65    SpO2: 100%    Weight: 97.1 kg (214 lb)    Height: 1.905 m (6' 3\")      Physical Exam  Gen: Alert and oriented ×3 male in no acute distress.  HEENT: Head is normocephalic.  Extraocular muscles are intact.  Tympanic membranes are clear.  Pharynx is clear.  Neck is supple without adenopathy or carotid bruits.  No masses or thyromegaly  Heart: Regular rate and rhythm without murmurs.  Lungs: Clear to auscultation bilaterally.  Abdomen: Soft with normal bowel sounds.  No masses or pain to palpation.  No bruits auscultated.  Extremities: Good range of motion of all joints except right shoulder which he guards from movement.  No significant edema. Pedal pulses +1-2/4  Neuro: No signs of focal neurologic deficit.  No tremor.  Speech and hearing are normal.  DTRs +3/4;  Muscle Strength +5/5.  Musculoskeletal: Spine with good ROM.  No scoliosis.  Leg lengths are equal.  Skin: No significant or irregular nevi visualized.  Psych: normal affect.  No suicidal ideation.  Good judgement and insight.     ECG done recently for preop evaluation    DIAGNOSIS/PLAN    1. Encounter for health maintenance examination (Primary)  - Lipid Panel  -Colon cancer screening if not done within the last 10 years (colonoscopy) or last 3 years with Cologuard    2. Hypertension, essential  -Patient will continue to monitor blood pressure at home and notify me in 2 weeks if blood pressure remains greater than 130/80  -Start lisinopril once daily  - lisinopril 20 mg tablet; Take 1 tablet (20 mg) by mouth once daily.  Dispense: 90 tablet; Refill: 3    3.  Right shoulder postoperative joint infection (Multi)  -Complete IV antibiotic.  Patient has doing much better since second surgery to irrigate wound and begin IV antibiotics.  - C-reactive protein    4. Vitamin D deficiency  - Vitamin D 25-Hydroxy,Total (for eval of Vitamin D levels)  - CBC    5. Benign " prostatic hyperplasia, unspecified whether lower urinary tract symptoms present  Early symptoms.  Discussed use of Uroxatrol  - Prostate Specific Antigen  - Urinalysis with Reflex Microscopic    6.  Significant cervical and lumbar spine arthritis  Continue Mobic 15 mg daily and home exercise program    7. 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 Slowing of urinary stream  - Prostate Specific Antigen  - Urinalysis with Reflex Microscopic    9.  Screening for hypogonadism male  - TSH with reflex to Free T4 if abnormal  - Testosterone          Return to office in 12 months for comprehensive medical evaluation, long-term medication use monitoring, and preventative services screening    We will continue to monitor, evaluate, assess and treat all problems/diagnoses as appropriate and continue to collaborate with specialists.    Encouraged to sign up with [facility]    Contact office or send a  [employer] message with any questions or concerns    Patient will only be notified of labs that require medical intervention.    Prescriptions will not be filled unless you are compliant with your follow up appointments or have a follow up appointment scheduled as per instruction of your physician. Refills should be requested at the time of your visit.    **Charting was completed using voice recognition technology and may include unintended errors**    Shakeel Coronado DO, FACOFP  [address], [address]  [place] [address] [phone]  Shakeel Coronado DO, JASMINP

## 2025-01-29 NOTE — PROGRESS NOTES
Physical Therapy Treatment    Patient Name: Clemente Coronado  MRN: [id number]  Today's Date: 1/30/2025  Time Calculation  Start Time: 1236  Stop Time: 1315  Time Calculation (min): 39 min     PT Therapeutic Procedures Time Entry  Manual Therapy Time Entry: 5  Therapeutic Exercise Time Entry: 34                 Payor: [employer] [facility] / Plan: [employer] / Product Type: *No Product type* /     Reason for Referral: R shoulder sx-RTC repair 12/3/24  General Comment: Visit 7 of 40    Current Problem:  Problem List Items Addressed This Visit             ICD-10-CM    Tear of right rotator cuff M75.101                 Subjective   Pt had MD follow up yesterday. Reports some soreness. States all stitches have been removed. One more antibiotic tx.     RTW 2/17/25    HEP compliance: yes     Pain:  Pain Assessment: 0-10  0-10 (Numeric) Pain Score: 3  Pain location: R shoulder     Precautions:  Precautions  Precautions Comment: Post op protocol, CA      Objective   PROM R shoulder flexion: 150 (1/16/25)  No new measurements today.     Treatment:  8 weeks post op 1/28/25  12 weeks post op 2/25/25    Therapeutic exercise  UBE 2.5/2.5  Pulleys flexion, abduction, IR 1.5 min ea (pt has pulleys at home)     Supine abd iso 3 sec x 10  SL ER mid range with PT support 2x10  Bent shoulder flexion 2x10  Doorway pec stretch arms down 30 sec x 2     Rows BTB 2x10 HEP  Pulldowns BTB 2x10 HEP   Tricep BTB 2x10 HEP  Isometric with band ER/IR GTB x 10 HEP  Bend shoulder flexion 2x10  Scaption-pain and shoulder hike     BSB modified in chair Y, T x 10  BSB modified in chair rows     SL shoulder flexion x 10   SL shoulder abd x 10   SL shoulder er x 10     Updated HEP 1/30/25    Manual   R shoulder LAD, PROM R shoulder flexion, abd, ER   Trigger point release to infraspinatus and UT on R       HEP  [email or web address]  Access Code: [email or web address]  URL: [email or web address]  Date: 01/23/2025  Prepared by: Sarah  Rocio    Exercises  - Circular Shoulder Pendulum with Table Support  - 2 x daily - 7 x weekly - 2-3 sets - 10 reps  - Flexion-Extension Shoulder Pendulum with Table Support  - 2 x daily - 7 x weekly - 2-3 sets - 10 reps  - Seated Scapular Retraction  - 2 x daily - 7 x weekly - 2-3 sets - 10 reps  - Supine Shoulder Flexion AAROM (Mirrored)  - 2 x daily - 7 x weekly - 2 sets - 10 reps  - Seated Shoulder Flexion Slide at Table Top with Forearm in Neutral  - 2 x daily - 7 x weekly - 2 sets - 10 reps  - Seated Shoulder Abduction Towel Slide at Table Top  - 2 x daily - 7 x weekly - 2 sets - 10 reps  - Isometric Shoulder Abduction at Wall  - 1 x daily - 3-4 x weekly - 2 sets - 10 reps - 5 seconds hold  - Isometric Shoulder Extension at Wall  - 1 x daily - 3-4 x weekly - 2 sets - 10 reps - 5 seconds hold  - Isometric Shoulder Flexion at Wall  - 1 x daily - 3-4 x weekly - 2 sets - 10 reps - 5 seconds hold  - Standing Isometric Shoulder Internal Rotation with Towel Roll at Doorway  - 1 x daily - 3-4 x weekly - 2 sets - 10 reps - 5 seconds hold  - Standing Isometric Shoulder External Rotation with Doorway and Towel Roll  - 1 x daily - 3-4 x weekly - 2 sets - 10 reps - 5 seconds hold  - Sidelying Shoulder External Rotation  - 1 x daily - 3-4 x weekly - 2-3 sets - 10 reps    Assessment  Crepitus in L shoulder with exercises. Overall great job with progressions however he still has some limitation in scaption AROM. Recommended bend shoulder flexion for HEP     Plan  Continue to progress POC as tolerated by patient to improve strength, mobility and overall function    Goals:  Active       PT Problem       Reduce pain at worst to less than or = 2/10 with all functional and recreational activity.        Start:  01/06/25    Expected End:  04/06/25            Increase ROM/flexibility to WFL to perform daily functional activities including dressing/bathing/grooming tasks and other household and work related tasks        Start:   01/06/25    Expected End:  04/06/25            Increase by > or = 1/2 mm grade to improve lifting/carrying household and work items to perform daily tasks without increased pain/compensation         Start:  01/06/25    Expected End:  04/06/25            Decrease Quick Dash score by > or = 8 points        Start:  01/06/25    Expected End:  04/06/25            Patient will demonstrate independence in home program for support of progression       Start:  01/06/25    Expected End:  04/06/25

## 2025-01-30 ENCOUNTER — TREATMENT (OUTPATIENT)
Dept: PHYSICAL THERAPY | Facility: CLINIC | Age: 55
End: 2025-01-30
Payer: COMMERCIAL

## 2025-01-30 DIAGNOSIS — M75.101 TEAR OF RIGHT ROTATOR CUFF, UNSPECIFIED TEAR EXTENT, UNSPECIFIED WHETHER TRAUMATIC: ICD-10-CM

## 2025-01-30 LAB
25(OH)D3+25(OH)D2 SERPL-MCNC: 43 NG/ML (ref 30–100)
APPEARANCE UR: CLEAR
BILIRUB UR QL STRIP: NEGATIVE
CHOLEST SERPL-MCNC: 278 MG/DL
CHOLEST/HDLC SERPL: 3.3 (CALC)
COLOR UR: YELLOW
CRP SERPL-MCNC: 4.6 MG/L
ERYTHROCYTE [DISTWIDTH] IN BLOOD BY AUTOMATED COUNT: 12.5 % (ref 11–15)
GLUCOSE UR QL STRIP: NEGATIVE
HCT VFR BLD AUTO: 42.2 % (ref 38.5–50)
HDLC SERPL-MCNC: 85 MG/DL
HGB BLD-MCNC: 13.9 G/DL (ref 13.2–17.1)
HGB UR QL STRIP: NEGATIVE
KETONES UR QL STRIP: NEGATIVE
LDLC SERPL CALC-MCNC: 171 MG/DL (CALC)
LEUKOCYTE ESTERASE UR QL STRIP: NEGATIVE
MCH RBC QN AUTO: 30.9 PG (ref 27–33)
MCHC RBC AUTO-ENTMCNC: 32.9 G/DL (ref 32–36)
MCV RBC AUTO: 93.8 FL (ref 80–100)
NITRITE UR QL STRIP: NEGATIVE
NONHDLC SERPL-MCNC: 193 MG/DL (CALC)
PH UR STRIP: 6 [PH] (ref 5–8)
PLATELET # BLD AUTO: 292 THOUSAND/UL (ref 140–400)
PMV BLD REES-ECKER: 9.6 FL (ref 7.5–12.5)
PROT UR QL STRIP: NEGATIVE
PSA SERPL-MCNC: 2.02 NG/ML
RBC # BLD AUTO: 4.5 MILLION/UL (ref 4.2–5.8)
SP GR UR STRIP: 1.01 (ref 1–1.03)
TESTOST SERPL-MCNC: 1041 NG/DL (ref 250–827)
TRIGL SERPL-MCNC: 102 MG/DL
TSH SERPL-ACNC: 2.5 MIU/L (ref 0.4–4.5)
WBC # BLD AUTO: 8 THOUSAND/UL (ref 3.8–10.8)

## 2025-01-30 PROCEDURE — 97110 THERAPEUTIC EXERCISES: CPT | Mod: GP | Performed by: PHYSICAL THERAPIST

## 2025-01-30 PROCEDURE — 97140 MANUAL THERAPY 1/> REGIONS: CPT | Mod: GP | Performed by: PHYSICAL THERAPIST

## 2025-01-30 ASSESSMENT — PAIN - FUNCTIONAL ASSESSMENT: PAIN_FUNCTIONAL_ASSESSMENT: 0-10

## 2025-01-30 ASSESSMENT — PAIN SCALES - GENERAL: PAINLEVEL_OUTOF10: 3

## 2025-02-02 DIAGNOSIS — Z12.11 COLON CANCER SCREENING: Primary | ICD-10-CM

## 2025-02-03 ENCOUNTER — INFUSION (OUTPATIENT)
Dept: INFUSION THERAPY | Facility: HOSPITAL | Age: 55
End: 2025-02-03
Payer: COMMERCIAL

## 2025-02-03 VITALS
TEMPERATURE: 96.8 F | SYSTOLIC BLOOD PRESSURE: 125 MMHG | OXYGEN SATURATION: 100 % | HEART RATE: 69 BPM | DIASTOLIC BLOOD PRESSURE: 77 MMHG | RESPIRATION RATE: 16 BRPM

## 2025-02-03 DIAGNOSIS — M01.X11: ICD-10-CM

## 2025-02-03 PROCEDURE — 2500000004 HC RX 250 GENERAL PHARMACY W/ HCPCS (ALT 636 FOR OP/ED): Performed by: STUDENT IN AN ORGANIZED HEALTH CARE EDUCATION/TRAINING PROGRAM

## 2025-02-03 PROCEDURE — 96365 THER/PROPH/DIAG IV INF INIT: CPT | Mod: INF

## 2025-02-03 RX ADMIN — DALBAVANCIN 500 MG: 500 INJECTION, POWDER, FOR SOLUTION INTRAVENOUS at 12:50

## 2025-02-03 ASSESSMENT — ENCOUNTER SYMPTOMS
OCCASIONAL FEELINGS OF UNSTEADINESS: 0
DEPRESSION: 0
LOSS OF SENSATION IN FEET: 0

## 2025-02-03 ASSESSMENT — PAIN SCALES - GENERAL: PAINLEVEL_OUTOF10: 3

## 2025-02-04 ENCOUNTER — TREATMENT (OUTPATIENT)
Dept: PHYSICAL THERAPY | Facility: CLINIC | Age: 55
End: 2025-02-04
Payer: COMMERCIAL

## 2025-02-04 DIAGNOSIS — M75.101 TEAR OF RIGHT ROTATOR CUFF, UNSPECIFIED TEAR EXTENT, UNSPECIFIED WHETHER TRAUMATIC: ICD-10-CM

## 2025-02-04 PROCEDURE — 97110 THERAPEUTIC EXERCISES: CPT | Mod: GP | Performed by: PHYSICAL THERAPIST

## 2025-02-04 PROCEDURE — 97140 MANUAL THERAPY 1/> REGIONS: CPT | Mod: GP | Performed by: PHYSICAL THERAPIST

## 2025-02-04 ASSESSMENT — PAIN - FUNCTIONAL ASSESSMENT: PAIN_FUNCTIONAL_ASSESSMENT: 0-10

## 2025-02-04 ASSESSMENT — PAIN SCALES - GENERAL: PAINLEVEL_OUTOF10: 3

## 2025-02-04 NOTE — PROGRESS NOTES
Physical Therapy Treatment    Patient Name: Clemente Coronado  MRN: [id number]  Today's Date: 2/4/2025  Time Calculation  Start Time: 1016  Stop Time: 1045  Time Calculation (min): 29 min     PT Therapeutic Procedures Time Entry  Manual Therapy Time Entry: 10  Therapeutic Exercise Time Entry: 19                 Payor: [employer] [facility] / Plan: [employer] / Product Type: *No Product type* /     Reason for Referral: R shoulder sx-RTC repair 12/3/24  General Comment: Visit 8 of 40    Current Problem:  Problem List Items Addressed This Visit             ICD-10-CM    Tear of right rotator cuff M75.101                   Subjective   Pt reports continued challenges with scaption ROM. States he cannot do without hiking shoulder.  Notes he is cautious with putting on shirt/dressing tasks     RTW 2/17/25    HEP compliance: yes     Pain:  Pain Assessment: 0-10  0-10 (Numeric) Pain Score: 3  Pain location: R shoulder     Precautions:  Precautions  Precautions Comment: Post op protocol, CA      Objective   PROM R shoulder flexion: 150 (1/16/25)    Recheck 2/4/25  Upper Extremity ROM: (WNL unless documented below) (p=pain)  ROM in Degrees RIGHT AROM LEFT AROM   Shoulder Flexion WNL with pain starting at 70* and with shoulder hike     Shoulder Abduction 80     Shoulder ER 73    Shoulder IR T12         Upper Extremity Strength: (WNL unless documented below) (p=pain)  MMT 5/5 max  RIGHT LEFT   Shoulder Flexion 3+ 4+   Shoulder Abduction 2+ 4+   Shoulder ER 4 4+   Shoulder IR 4+ 5       Treatment:  8 weeks post op 1/28/25  12 weeks post op 2/25/25    Therapeutic exercise  UBE 1.5/1.5  AROM IR x 10   SL shoulder abd with thumb up 2x10  SL shoulder flexion   Prone Y, T 2x10  Scaption-pain and shoulder hike   Perturbations R shoulder 30 sec x 2     Held  BSB modified in chair Y, T x 10  BSB modified in chair rows     Updated HEP 1/30/25    Manual   R shoulder LAD, PROM R shoulder flexion, abd, ER   Trigger point release to  infraspinatus and UT on R-held   Inferior and AP mobs performed to R shoulder as tolerated       HEP  [email or web address]  Access Code: [email or web address]  URL: [email or web address]  Date: 01/23/2025  Prepared by: Sarah Chan    Exercises  - Circular Shoulder Pendulum with Table Support  - 2 x daily - 7 x weekly - 2-3 sets - 10 reps  - Flexion-Extension Shoulder Pendulum with Table Support  - 2 x daily - 7 x weekly - 2-3 sets - 10 reps  - Seated Scapular Retraction  - 2 x daily - 7 x weekly - 2-3 sets - 10 reps  - Supine Shoulder Flexion AAROM (Mirrored)  - 2 x daily - 7 x weekly - 2 sets - 10 reps  - Seated Shoulder Flexion Slide at Table Top with Forearm in Neutral  - 2 x daily - 7 x weekly - 2 sets - 10 reps  - Seated Shoulder Abduction Towel Slide at Table Top  - 2 x daily - 7 x weekly - 2 sets - 10 reps  - Isometric Shoulder Abduction at Wall  - 1 x daily - 3-4 x weekly - 2 sets - 10 reps - 5 seconds hold  - Isometric Shoulder Extension at Wall  - 1 x daily - 3-4 x weekly - 2 sets - 10 reps - 5 seconds hold  - Isometric Shoulder Flexion at Wall  - 1 x daily - 3-4 x weekly - 2 sets - 10 reps - 5 seconds hold  - Standing Isometric Shoulder Internal Rotation with Towel Roll at Doorway  - 1 x daily - 3-4 x weekly - 2 sets - 10 reps - 5 seconds hold  - Standing Isometric Shoulder External Rotation with Doorway and Towel Roll  - 1 x daily - 3-4 x weekly - 2 sets - 10 reps - 5 seconds hold  - Sidelying Shoulder External Rotation  - 1 x daily - 3-4 x weekly - 2-3 sets - 10 reps    Assessment  Crepitus in R shoulder continues. He demonstrates improved AROM and strength in R shoulder but experiences fatigue and demonstrates some compensation with shoulder hike. He will continue to benefit from strengthening. Discussed tendon healing and avoiding overdoing it.     Plan  Continue to progress POC as tolerated by patient to improve strength, mobility and overall function    Goals:  Active       PT Problem       Reduce  pain at worst to less than or = 2/10 with all functional and recreational activity.  (Progressing)       Start:  01/06/25    Expected End:  04/06/25            Increase ROM/flexibility to WFL to perform daily functional activities including dressing/bathing/grooming tasks and other household and work related tasks  (Progressing)       Start:  01/06/25    Expected End:  04/06/25            Increase by > or = 1/2 mm grade to improve lifting/carrying household and work items to perform daily tasks without increased pain/compensation   (Progressing)       Start:  01/06/25    Expected End:  04/06/25            Decrease Quick Dash score by > or = 8 points        Start:  01/06/25    Expected End:  04/06/25            Patient will demonstrate independence in home program for support of progression (Progressing)       Start:  01/06/25    Expected End:  04/06/25         Goal Note       Patient will demonstrate independence in home program for support of progression

## 2025-02-05 ENCOUNTER — APPOINTMENT (OUTPATIENT)
Dept: ORTHOPEDIC SURGERY | Age: 55
End: 2025-02-05
Payer: COMMERCIAL

## 2025-02-05 VITALS — HEIGHT: 75 IN | WEIGHT: 214 LBS | BODY MASS INDEX: 26.61 KG/M2

## 2025-02-05 DIAGNOSIS — M01.X11: Primary | ICD-10-CM

## 2025-02-05 PROCEDURE — 3008F BODY MASS INDEX DOCD: CPT | Performed by: STUDENT IN AN ORGANIZED HEALTH CARE EDUCATION/TRAINING PROGRAM

## 2025-02-05 PROCEDURE — 99024 POSTOP FOLLOW-UP VISIT: CPT | Performed by: STUDENT IN AN ORGANIZED HEALTH CARE EDUCATION/TRAINING PROGRAM

## 2025-02-05 PROCEDURE — 1036F TOBACCO NON-USER: CPT | Performed by: STUDENT IN AN ORGANIZED HEALTH CARE EDUCATION/TRAINING PROGRAM

## 2025-02-05 NOTE — PROGRESS NOTES
Physical Therapy Treatment    Patient Name: Clemente Coronado  MRN: [id number]  Today's Date: 2/6/2025  Time Calculation  Start Time: 1013  Stop Time: 1046  Time Calculation (min): 33 min     PT Therapeutic Procedures Time Entry  Manual Therapy Time Entry: 13  Therapeutic Exercise Time Entry: 20                 Payor: [employer] [facility] / Plan: [employer] / Product Type: *No Product type* /     Reason for Referral: R shoulder sx-RTC repair 12/3/24  General Comment: Visit 9 of 40    Current Problem:  Problem List Items Addressed This Visit             ICD-10-CM    Tear of right rotator cuff M75.101                     Subjective   Pt that he had follow up with MD yesterday.     Pt will be gone for vacation and then will resume work afterward.   Would like to schedule a PT follow up for 1st week of March   No pain at rest     Arrived late to appt     RTW 2/17/25    HEP compliance: yes     Pain:  Pain Assessment: 0-10  0-10 (Numeric) Pain Score: 3  Pain location: R shoulder     Precautions:  Precautions  Precautions Comment: Post op protocol, CA      Objective   PROM R shoulder flexion: 150 (1/16/25)    Recheck 2/4/25  Upper Extremity ROM: (WNL unless documented below) (p=pain)  ROM in Degrees RIGHT AROM LEFT AROM   Shoulder Flexion WNL with pain starting at 70* and with shoulder hike     Shoulder Abduction 80     Shoulder ER 73    Shoulder IR T12         Upper Extremity Strength: (WNL unless documented below) (p=pain)  MMT 5/5 max  RIGHT LEFT   Shoulder Flexion 3+ 4+   Shoulder Abduction 2+ 4+   Shoulder ER 4 4+   Shoulder IR 4+ 5       Treatment:  8 weeks post op 1/28/25  12 weeks post op 2/25/25    Therapeutic exercise  UBE 1.5/1.5  AROM IR x 10   SL shoulder abd with thumb up 2x10  SL shoulder flexion   Prone Y, T 2x10  Scaption-pain and shoulder hike   Perturbations R shoulder 30 sec x 2-held   BSB modified in chair Y, T 2x10  ER pullout iso with yellow loop 2x10  Supine D2 flexion 2x10    BSB modified  in chair rows     Updated HEP 2/6/25    Manual   R shoulder LAD, PROM R shoulder flexion, abd, ER   Trigger point release to infraspinatus and UT on R  Inferior and AP mobs performed to R shoulder as tolerated       HEP  [email or web address]  Access Code: [email or web address]  URL: [email or web address]  Date: 01/23/2025  Prepared by: Sarah Chan    Exercises  - Circular Shoulder Pendulum with Table Support  - 2 x daily - 7 x weekly - 2-3 sets - 10 reps  - Flexion-Extension Shoulder Pendulum with Table Support  - 2 x daily - 7 x weekly - 2-3 sets - 10 reps  - Seated Scapular Retraction  - 2 x daily - 7 x weekly - 2-3 sets - 10 reps  - Supine Shoulder Flexion AAROM (Mirrored)  - 2 x daily - 7 x weekly - 2 sets - 10 reps  - Seated Shoulder Flexion Slide at Table Top with Forearm in Neutral  - 2 x daily - 7 x weekly - 2 sets - 10 reps  - Seated Shoulder Abduction Towel Slide at Table Top  - 2 x daily - 7 x weekly - 2 sets - 10 reps  - Isometric Shoulder Abduction at Wall  - 1 x daily - 3-4 x weekly - 2 sets - 10 reps - 5 seconds hold  - Isometric Shoulder Extension at Wall  - 1 x daily - 3-4 x weekly - 2 sets - 10 reps - 5 seconds hold  - Isometric Shoulder Flexion at Wall  - 1 x daily - 3-4 x weekly - 2 sets - 10 reps - 5 seconds hold  - Standing Isometric Shoulder Internal Rotation with Towel Roll at Doorway  - 1 x daily - 3-4 x weekly - 2 sets - 10 reps - 5 seconds hold  - Standing Isometric Shoulder External Rotation with Doorway and Towel Roll  - 1 x daily - 3-4 x weekly - 2 sets - 10 reps - 5 seconds hold  - Sidelying Shoulder External Rotation  - 1 x daily - 3-4 x weekly - 2-3 sets - 10 reps    Assessment  Pt demonstrated good shoulder flexion over the ball today. He continues to tolerate manual tx well with some TTP over the infraspinatus.     Plan  Continue to progress POC as tolerated by patient to improve strength, mobility and overall function    Goals:  Active       PT Problem       Reduce pain at worst  to less than or = 2/10 with all functional and recreational activity.  (Progressing)       Start:  01/06/25    Expected End:  04/06/25            Increase ROM/flexibility to WFL to perform daily functional activities including dressing/bathing/grooming tasks and other household and work related tasks  (Progressing)       Start:  01/06/25    Expected End:  04/06/25            Increase by > or = 1/2 mm grade to improve lifting/carrying household and work items to perform daily tasks without increased pain/compensation   (Progressing)       Start:  01/06/25    Expected End:  04/06/25            Decrease Quick Dash score by > or = 8 points        Start:  01/06/25    Expected End:  04/06/25            Patient will demonstrate independence in home program for support of progression (Progressing)       Start:  01/06/25    Expected End:  04/06/25         Goal Note       Patient will demonstrate independence in home program for support of progression

## 2025-02-05 NOTE — PROGRESS NOTES
PRIMARY CARE PHYSICIAN: Shakeel Coronado DO    ORTHOPAEDIC POSTOPERATIVE VISIT    ASSESSMENT & PLAN    Impression: 55 y.o. male 9 weeks postop s/p Right shoulder arthroscopy, rotator cuff repair, subacromial decompression acromioplasty, extensive intra-articular debridement and synovectomy done 12/03/24 and 3 week status post Right shoulder arthroscopic irrigation, extensive intra-articular debridement and synovectomy done 1/14/25.     Plan:   Overall Clemente is continues to improve. He has completed his antibiotic regimen under the guidance of infectious disease.  He states that his fevers and pain in the shoulder have nearly completely resolved.  He is very happy with his progress thus far as he feels like he is improving significantly.  He has no complaints at this time.  He will continue working with physical therapy through the postoperative protocol for the above.  He understands his precautions.  He is planning to return to work mid February which I believe is reasonable at this time.  He will start with a lighter workload and then ramp up as he tolerates.  He will return to see me in 4 weeks for repeat evaluation.    I reviewed their postoperative timeline and plan with them. They understand the postoperative precautions and the treatment plan going forward.     At the end of the visit, all questions were answered in full. The patient is in agreement with the plan and recommendations. They will call the office with any questions/concerns.    Note dictated with [email or web address] software. Completed without full typed error editing and sent to avoid delay.      SUBJECTIVE  CHIEF COMPLAINT: Post-op     HPI: Clemente Coronado is a 55 y.o. patient who is now 9 weeks postop s/p Right shoulder arthroscopy, rotator cuff repair, subacromial decompression acromioplasty, extensive intra-articular debridement and synovectomy done 12/03/24 and 3 weeks status post Right shoulder arthroscopic irrigation, extensive  intra-articular debridement and synovectomy done 1/14/25. States he received his last dose of IV antibiotic infusion and has also been continuing physical therapy.  No further drainage.  His pain is well-controlled.    HPI from 1/27/25  He states that he is doing well. Pain is well controlled.  No further drainage from his incision.  He has been started on his antibiotic infusion under the guidance of infectious disease.      HPI from 1/22/25  Overall Clemente is doing fine. He states he has some bloody drainage out of the anterior portal incision but denies any purulent drainage. He is currently undergoing antibiotic infusions per infectious disease. Pain is controlled at this time. No fevers/chills. Still working with physical therapy. Discontinued with sling.       REVIEW OF SYSTEMS  Constitutional: See HPI for pain assessment, No significant weight loss, recent trauma  Cardiovascular: No chest pain, shortness of breath  Respiratory: No difficulty breathing, cough  Gastrointestinal: No nausea, vomiting, diarrhea, constipation  Musculoskeletal: Noted in HPI, positive for pain, restricted motion, stiffness  Integumentary: No rashes, easy bruising, redness   Neurological: no numbness or tingling in extremities, no gait disturbances   Psychiatric: No mood changes, memory changes, social issues  Heme/Lymph: no excessive swelling, easy bruising, excessive bleeding  ENT: No hearing changes  Eyes: No vision changes    CURRENT MEDICATIONS:   Current Outpatient Medications   Medication Sig Dispense Refill    lisinopril 20 mg tablet Take 1 tablet (20 mg) by mouth once daily. 90 tablet 3    meloxicam (Mobic) 15 mg tablet Take 1 tablet (15 mg) by mouth once daily. 30 tablet 3     No current facility-administered medications for this visit.        OBJECTIVE    PHYSICAL EXAM      1/27/2025    11:03 AM 1/27/2025    11:52 AM 1/27/2025     1:39 PM 1/29/2025    12:09 PM 1/29/2025    12:13 PM 2/3/2025    12:11 PM 2/3/2025     1:30 PM  "  Vitals   Systolic  167 145 163 159 143 125   Diastolic  100 84 101 99 83 77   Heart Rate  58 62 65  68 69   Temp  35.6 °C (96 °F)    36 °C (96.8 °F)    Resp  18 18   16 16   Height 1.905 m (6' 3\")   1.905 m (6' 3\")      Weight (lb) 212   214      BMI 26.5 kg/m2   26.75 kg/m2      BSA (m2) 2.26 m2   2.27 m2      Visit Report Report Report Report Report Report        There is no height or weight on file to calculate BMI.    General: Well-appearing, no acute distress    Skin intact bilateral upper and lower extremities  No erythema  No warmth    Detailed examination of the right shoulder demonstrates:  Surgical incision(s) healing well, sutures removed  No drainage  No ecchymosis  Trace residual resolving soft tissue swelling  Biceps contour normal  Shoulder range of motion: Forward flexion to 90, ER to neutral  Upper extremity motor grossly intact  C5-T1 sensation intact bilaterally  2+ radial pulses bilaterally  Warm and well-perfused, brisk capillary refill    IMAGING:   No new imaging    "

## 2025-02-06 ENCOUNTER — TREATMENT (OUTPATIENT)
Dept: PHYSICAL THERAPY | Facility: CLINIC | Age: 55
End: 2025-02-06
Payer: COMMERCIAL

## 2025-02-06 DIAGNOSIS — M75.101 TEAR OF RIGHT ROTATOR CUFF, UNSPECIFIED TEAR EXTENT, UNSPECIFIED WHETHER TRAUMATIC: ICD-10-CM

## 2025-02-06 PROCEDURE — 97110 THERAPEUTIC EXERCISES: CPT | Mod: GP | Performed by: PHYSICAL THERAPIST

## 2025-02-06 PROCEDURE — 97140 MANUAL THERAPY 1/> REGIONS: CPT | Mod: GP | Performed by: PHYSICAL THERAPIST

## 2025-02-06 ASSESSMENT — PAIN SCALES - GENERAL: PAINLEVEL_OUTOF10: 3

## 2025-02-06 ASSESSMENT — PAIN - FUNCTIONAL ASSESSMENT: PAIN_FUNCTIONAL_ASSESSMENT: 0-10

## 2025-03-03 ENCOUNTER — APPOINTMENT (OUTPATIENT)
Dept: DERMATOLOGY | Facility: CLINIC | Age: 55
End: 2025-03-03
Payer: COMMERCIAL

## 2025-03-03 DIAGNOSIS — L82.1 SEBORRHEIC KERATOSIS: ICD-10-CM

## 2025-03-03 DIAGNOSIS — Z12.83 SCREENING EXAM FOR SKIN CANCER: ICD-10-CM

## 2025-03-03 DIAGNOSIS — D22.9 NEVUS: Primary | ICD-10-CM

## 2025-03-03 DIAGNOSIS — L81.4 LENTIGO: ICD-10-CM

## 2025-03-03 DIAGNOSIS — D18.01 CHERRY ANGIOMA: ICD-10-CM

## 2025-03-03 PROCEDURE — 1036F TOBACCO NON-USER: CPT | Performed by: NURSE PRACTITIONER

## 2025-03-03 PROCEDURE — 99203 OFFICE O/P NEW LOW 30 MIN: CPT | Performed by: NURSE PRACTITIONER

## 2025-03-03 NOTE — PROGRESS NOTES
Subjective     Clemente Coronado is a 55 y.o. male who presents for the following: Skin Check.   New patient in for full body skin exam.     Review of Systems:  No other skin or systemic complaints other than what is documented elsewhere in the note.    The following portions of the chart were reviewed this encounter and updated as appropriate:       Skin Cancer History  No skin cancer on file.    Specialty Problems    None    Past Medical History:  Clemente Coronado  has a past medical history of Hypertension, Lymphoma (2005), OA (osteoarthritis), and Rotator cuff syndrome.    He has no past medical history of Malignant hyperthermia, PONV (postoperative nausea and vomiting), or Refusal of blood product.    Past Surgical History:  Clemente Coronado  has a past surgical history that includes Knee arthroscopy w/ debridement (Bilateral); Total knee arthroplasty (Left, 2023); Arthroscopy shoulder w/ open rotator cuff repair (Bilateral); Tonsillectomy; Shoulder arthroscopy w/ rotator cuff repair (Right, 12/2024); Knee Arthroplasty; and Rotator cuff repair.    Family History:  Patient family history includes Benign prostatic hyperplasia in his father; HTN in his father and mother; Hypothyroidism in his mother; head and neck cancer in his brother.    Social History:  Clemente Coronado  reports that he has never smoked. He has never used smokeless tobacco. He reports current alcohol use. He reports that he does not currently use drugs.    Allergies:  Patient has no known allergies.    Current Medications / CAM's:    Current Outpatient Medications:     lisinopril 20 mg tablet, Take 1 tablet (20 mg) by mouth once daily., Disp: 90 tablet, Rfl: 3    meloxicam (Mobic) 15 mg tablet, Take 1 tablet (15 mg) by mouth once daily., Disp: 30 tablet, Rfl: 3     Objective   Well appearing patient in no apparent distress; mood and affect are within normal limits.      Assessment/Plan   1. Nevus  Uniform pigmented macule(s)/papule(s) with reassuring findings on  dermoscopy    -Discussed nature of condition  -Reassurance, benign-appearing features on examination today  -Recommend continued observation    2. Lentigo  Tan macules    -Benign appearing on exam  -Reassurance, recommend observation    3. Cherry angioma  Cherry red papules    -Discussed nature of condition  -Reassurance, recommend continued observation    4. Seborrheic keratosis  Stuck on, waxy macule(s)/papule(s)/plaque(s) with comedo-like openings and milia like cysts    -Discussed nature of condition  -Reassurance, recommend continued observation    5. Screening exam for skin cancer

## 2025-03-04 ENCOUNTER — TREATMENT (OUTPATIENT)
Dept: PHYSICAL THERAPY | Facility: CLINIC | Age: 55
End: 2025-03-04
Payer: COMMERCIAL

## 2025-03-04 DIAGNOSIS — M75.101 TEAR OF RIGHT ROTATOR CUFF, UNSPECIFIED TEAR EXTENT, UNSPECIFIED WHETHER TRAUMATIC: ICD-10-CM

## 2025-03-04 PROCEDURE — 97110 THERAPEUTIC EXERCISES: CPT | Mod: GP | Performed by: PHYSICAL THERAPIST

## 2025-03-04 PROCEDURE — 97140 MANUAL THERAPY 1/> REGIONS: CPT | Mod: GP | Performed by: PHYSICAL THERAPIST

## 2025-03-04 ASSESSMENT — PAIN SCALES - GENERAL: PAINLEVEL_OUTOF10: 4

## 2025-03-04 ASSESSMENT — PAIN - FUNCTIONAL ASSESSMENT: PAIN_FUNCTIONAL_ASSESSMENT: 0-10

## 2025-03-04 NOTE — PROGRESS NOTES
Physical Therapy Treatment    Patient Name: Clemente Coronado  MRN: [id number]  Today's Date: 3/4/2025  Time Calculation  Start Time: 1051  Stop Time: 1132  Time Calculation (min): 41 min     PT Therapeutic Procedures Time Entry  Manual Therapy Time Entry: 15  Therapeutic Exercise Time Entry: 26                 Payor: [employer] [facility] / Plan: [employer] / Product Type: *No Product type* /     Reason for Referral: R shoulder sx-RTC repair 12/3/24  General Comment: Visit 10 of 40    Current Problem:  Problem List Items Addressed This Visit             ICD-10-CM    Tear of right rotator cuff M75.101                     Subjective   Pt reports soreness after returning to work the past two weeks. 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 Notes he thinks he may have some compensatory mm activation in neck.   Pain reaches an 8/10 at work, I.e. reaching up to grab light.   Pain with pushing door open, putting coffee cup in microwave, shake someones hand.     MD follow up 3/7/25      HEP compliance: yes     Pain:  Pain Assessment: 0-10  0-10 (Numeric) Pain Score: 4  Pain location: R shoulder     Precautions:  Precautions  Precautions Comment: Post op protocol, CA      Objective     Recheck 2/4/25  Recheck 3/4/25  Upper Extremity ROM: (WNL unless documented below) (p=pain)  ROM in Degrees RIGHT AROM LEFT AROM   Shoulder Flexion WNL  Shoulder hike and pain   Pain at 50*     Shoulder Abduction WNL pain and difficulty at about 40*     Shoulder ER 73    Shoulder IR T12         Upper Extremity Strength: (WNL unless documented below) (p=pain)  MMT 5/5 max  RIGHT LEFT   Shoulder Flexion 3+ 4+   Shoulder Abduction 2+ 4+   Shoulder ER 4 4+   Shoulder IR 4+ 5       Treatment:  8 weeks post op 1/28/25  12 weeks post op 2/25/25    Therapeutic exercise  UBE x 5 min   Supine shoulder adduction 2x10  SA punch supine   Supine D2 flexion x10  SL shoulder abd with elbow flexed x 5 with pain   Levator and UT stretch     Held:   AROM IR x 10    SL shoulder abd with thumb up 2x10  SL shoulder flexion   Prone Y, T 2x10  Scaption-pain and shoulder hike   Perturbations R shoulder 30 sec x 2-held   BSB modified in chair Y, T 2x10  ER pullout iso with yellow loop 2x10  BSB modified in chair rows       Manual   R shoulder LAD, PROM R shoulder flexion, abd, ER   Trigger point release to infraspinatus and UT on R  Inferior and AP mobs performed to R shoulder as tolerated held   R UT stretch     HEP  [email or web address]  Access Code: [email or web address]  URL: [email or web address]  Date: 01/23/2025  Prepared by: Sarah Chan    Exercises  - Circular Shoulder Pendulum with Table Support  - 2 x daily - 7 x weekly - 2-3 sets - 10 reps  - Flexion-Extension Shoulder Pendulum with Table Support  - 2 x daily - 7 x weekly - 2-3 sets - 10 reps  - Seated Scapular Retraction  - 2 x daily - 7 x weekly - 2-3 sets - 10 reps  - Supine Shoulder Flexion AAROM (Mirrored)  - 2 x daily - 7 x weekly - 2 sets - 10 reps  - Seated Shoulder Flexion Slide at Table Top with Forearm in Neutral  - 2 x daily - 7 x weekly - 2 sets - 10 reps  - Seated Shoulder Abduction Towel Slide at Table Top  - 2 x daily - 7 x weekly - 2 sets - 10 reps  - Isometric Shoulder Abduction at Wall  - 1 x daily - 3-4 x weekly - 2 sets - 10 reps - 5 seconds hold  - Isometric Shoulder Extension at Wall  - 1 x daily - 3-4 x weekly - 2 sets - 10 reps - 5 seconds hold  - Isometric Shoulder Flexion at Wall  - 1 x daily - 3-4 x weekly - 2 sets - 10 reps - 5 seconds hold  - Standing Isometric Shoulder Internal Rotation with Towel Roll at Doorway  - 1 x daily - 3-4 x weekly - 2 sets - 10 reps - 5 seconds hold  - Standing Isometric Shoulder External Rotation with Doorway and Towel Roll  - 1 x daily - 3-4 x weekly - 2 sets - 10 reps - 5 seconds hold  - Sidelying Shoulder External Rotation  - 1 x daily - 3-4 x weekly - 2-3 sets - 10 reps    Assessment  Pt demonstrates pain with ROM. Significant painful arc. He demonstrates  UT compensation. Continues to have tightness in UT and trigger points in infraspinatus.     Plan  Continue to progress POC as tolerated by patient to improve strength, mobility and overall function    Goals:  Active       PT Problem       Reduce pain at worst to less than or = 2/10 with all functional and recreational activity.  (Progressing)       Start:  01/06/25    Expected End:  04/06/25            Increase ROM/flexibility to WFL to perform daily functional activities including dressing/bathing/grooming tasks and other household and work related tasks  (Progressing)       Start:  01/06/25    Expected End:  04/06/25            Increase by > or = 1/2 mm grade to improve lifting/carrying household and work items to perform daily tasks without increased pain/compensation   (Progressing)       Start:  01/06/25    Expected End:  04/06/25            Decrease Quick Dash score by > or = 8 points        Start:  01/06/25    Expected End:  04/06/25            Patient will demonstrate independence in home program for support of progression (Progressing)       Start:  01/06/25    Expected End:  04/06/25         Goal Note       Patient will demonstrate independence in home program for support of progression

## 2025-03-05 ENCOUNTER — APPOINTMENT (OUTPATIENT)
Dept: ORTHOPEDIC SURGERY | Age: 55
End: 2025-03-05
Payer: COMMERCIAL

## 2025-03-07 ENCOUNTER — APPOINTMENT (OUTPATIENT)
Dept: ORTHOPEDIC SURGERY | Facility: HOSPITAL | Age: 55
End: 2025-03-07
Payer: COMMERCIAL

## 2025-03-07 VITALS — HEIGHT: 75 IN | BODY MASS INDEX: 26.86 KG/M2 | WEIGHT: 216 LBS

## 2025-03-07 DIAGNOSIS — M75.101 TEAR OF RIGHT ROTATOR CUFF, UNSPECIFIED TEAR EXTENT, UNSPECIFIED WHETHER TRAUMATIC: ICD-10-CM

## 2025-03-07 DIAGNOSIS — M01.X11: ICD-10-CM

## 2025-03-07 PROCEDURE — 1036F TOBACCO NON-USER: CPT | Performed by: STUDENT IN AN ORGANIZED HEALTH CARE EDUCATION/TRAINING PROGRAM

## 2025-03-07 PROCEDURE — 99211 OFF/OP EST MAY X REQ PHY/QHP: CPT | Performed by: STUDENT IN AN ORGANIZED HEALTH CARE EDUCATION/TRAINING PROGRAM

## 2025-03-07 PROCEDURE — 3008F BODY MASS INDEX DOCD: CPT | Performed by: STUDENT IN AN ORGANIZED HEALTH CARE EDUCATION/TRAINING PROGRAM

## 2025-03-07 ASSESSMENT — PAIN - FUNCTIONAL ASSESSMENT: PAIN_FUNCTIONAL_ASSESSMENT: 0-10

## 2025-03-07 ASSESSMENT — PAIN SCALES - GENERAL: PAINLEVEL_OUTOF10: 3

## 2025-03-07 NOTE — PROGRESS NOTES
PRIMARY CARE PHYSICIAN: Shakeel Coronado DO    ORTHOPAEDIC POSTOPERATIVE VISIT    ASSESSMENT & PLAN    Impression: 55 y.o. male 12 weeks postop s/p Right shoulder arthroscopy, rotator cuff repair, subacromial decompression acromioplasty, extensive intra-articular debridement and synovectomy done 12/03/24 and 7 week status post Right shoulder arthroscopic irrigation, extensive intra-articular debridement and synovectomy done 1/14/25.     Plan:   Overall Clemente is continues to improve. He has completed his antibiotic regimen under the guidance of infectious disease.  It appears as though he cleared his infection.  He states he continues to have significant limitations related to this right shoulder that affects his ability to do his job at work.  He would like to discuss next treatment steps including possible revision rotator cuff repair.  I offered him a second opinion with my my colleagues but he states that he trusts me and would like to proceed.  Prior to proceeding with a revision rotator cuff repair, I will send him to one of my primary care sports medicine colleagues for an ultrasound-guided aspiration of the right shoulder to ensure that there is no residual infection.  They will send this for culture which will be held for an additional timeframe in order to ensure there is no further C acnes in the shoulder.  He also recently had a CRP which was negative.  I do believe that he would benefit from a revision rotator cuff repair as long as his shoulder has cleared the infection.  Once he decides he is ready to proceed with the revision rotator cuff repair, we will obtain a repeat MRI of the right shoulder for preoperative planning.    I reviewed their postoperative timeline and plan with them. They understand the postoperative precautions and the treatment plan going forward.     At the end of the visit, all questions were answered in full. The patient is in agreement with the plan and recommendations. They will  call the office with any questions/concerns.    Note dictated with [employer] software. Completed without full typed error editing and sent to avoid delay.      SUBJECTIVE  CHIEF COMPLAINT: Post-op     HPI: Clemente Coronado is a 55 y.o. patient who is now 12 weeks postop s/p Right shoulder arthroscopy, rotator cuff repair, subacromial decompression acromioplasty, extensive intra-articular debridement and synovectomy done 12/03/24 and 7 weeks status post Right shoulder arthroscopic irrigation, extensive intra-articular debridement and synovectomy done 1/14/25. He is still experiencing significant right shoulder pain, especially when at work. He struggles to lift his right arm above chest level. He is able to sleep without issue. Deonte denies any new injury to the shoulder since his last visit.         REVIEW OF SYSTEMS  Constitutional: See HPI for pain assessment, No significant weight loss, recent trauma  Cardiovascular: No chest pain, shortness of breath  Respiratory: No difficulty breathing, cough  Gastrointestinal: No nausea, vomiting, diarrhea, constipation  Musculoskeletal: Noted in HPI, positive for pain, restricted motion, stiffness  Integumentary: No rashes, easy bruising, redness   Neurological: no numbness or tingling in extremities, no gait disturbances   Psychiatric: No mood changes, memory changes, social issues  Heme/Lymph: no excessive swelling, easy bruising, excessive bleeding  ENT: No hearing changes  Eyes: No vision changes    CURRENT MEDICATIONS:   Current Outpatient Medications   Medication Sig Dispense Refill    lisinopril 20 mg tablet Take 1 tablet (20 mg) by mouth once daily. 90 tablet 3    meloxicam (Mobic) 15 mg tablet Take 1 tablet (15 mg) by mouth once daily. 30 tablet 3     No current facility-administered medications for this visit.        OBJECTIVE    PHYSICAL EXAM      1/27/2025    11:52 AM 1/27/2025     1:39 PM 1/29/2025    12:09 PM 1/29/2025    12:13 PM 2/3/2025    12:11  "PM 2/3/2025     1:30 PM 2/5/2025     1:21 PM   Vitals   Systolic 167 145 163 159 143 125    Diastolic 100 84 101 99 83 77    Heart Rate 58 62 65  68 69    Temp 35.6 °C (96 °F)    36 °C (96.8 °F)     Resp 18 18   16 16    Height   1.905 m (6' 3\")    1.905 m (6' 3\")   Weight (lb)   214    214   BMI   26.75 kg/m2    26.75 kg/m2   BSA (m2)   2.27 m2    2.27 m2   Visit Report Report Report Report Report   Report      There is no height or weight on file to calculate BMI.    General: Well-appearing, no acute distress    Skin intact bilateral upper and lower extremities  No erythema  No warmth    Detailed examination of the right shoulder demonstrates:  Surgical incision(s) well-healed  No ecchymosis  No notable soft tissue swelling  Biceps contour normal  Shoulder range of motion: Forward flexion to 140, ER 30, IR upper lumbar  Jobes 4-/5, ER 4+/5, belly press 4+/5  Upper extremity motor grossly intact  C5-T1 sensation intact bilaterally  2+ radial pulses bilaterally  Warm and well-perfused, brisk capillary refill    IMAGING:   No new imaging    "

## 2025-03-20 DIAGNOSIS — Z12.11 SCREENING FOR COLON CANCER: Primary | ICD-10-CM

## 2025-03-20 RX ORDER — POLYETHYLENE GLYCOL 3350, SODIUM SULFATE ANHYDROUS, SODIUM BICARBONATE, SODIUM CHLORIDE, POTASSIUM CHLORIDE 236; 22.74; 6.74; 5.86; 2.97 G/4L; G/4L; G/4L; G/4L; G/4L
4 POWDER, FOR SOLUTION ORAL ONCE
Qty: 4000 ML | Refills: 0 | Status: SHIPPED | OUTPATIENT
Start: 2025-03-20 | End: 2025-03-20

## 2025-03-20 NOTE — TELEPHONE ENCOUNTER
Pended colonoscopy prep, [employer], for approval.  Instructions sent to patient via [employer], and notified over the phone to  prescription.

## 2025-04-15 ENCOUNTER — APPOINTMENT (OUTPATIENT)
Dept: ORTHOPEDIC SURGERY | Facility: CLINIC | Age: 55
End: 2025-04-15
Payer: COMMERCIAL

## 2025-04-15 ENCOUNTER — HOSPITAL ENCOUNTER (OUTPATIENT)
Dept: RADIOLOGY | Facility: EXTERNAL LOCATION | Age: 55
Discharge: HOME | End: 2025-04-15

## 2025-04-15 VITALS — HEIGHT: 75 IN | BODY MASS INDEX: 26.86 KG/M2 | WEIGHT: 216 LBS

## 2025-04-15 DIAGNOSIS — M75.101 TEAR OF RIGHT ROTATOR CUFF, UNSPECIFIED TEAR EXTENT, UNSPECIFIED WHETHER TRAUMATIC: ICD-10-CM

## 2025-04-15 DIAGNOSIS — M01.X11: Primary | ICD-10-CM

## 2025-04-15 PROCEDURE — 1036F TOBACCO NON-USER: CPT | Performed by: EMERGENCY MEDICINE

## 2025-04-15 PROCEDURE — 20611 DRAIN/INJ JOINT/BURSA W/US: CPT | Performed by: EMERGENCY MEDICINE

## 2025-04-15 PROCEDURE — 99214 OFFICE O/P EST MOD 30 MIN: CPT | Performed by: EMERGENCY MEDICINE

## 2025-04-15 PROCEDURE — 3008F BODY MASS INDEX DOCD: CPT | Performed by: EMERGENCY MEDICINE

## 2025-04-15 RX ORDER — LIDOCAINE HYDROCHLORIDE 10 MG/ML
2 INJECTION, SOLUTION INFILTRATION; PERINEURAL
Status: COMPLETED | OUTPATIENT
Start: 2025-04-15 | End: 2025-04-15

## 2025-04-15 RX ADMIN — LIDOCAINE HYDROCHLORIDE 2 ML: 10 INJECTION, SOLUTION INFILTRATION; PERINEURAL at 10:14

## 2025-04-15 NOTE — PROGRESS NOTES
Subjective    Patient ID: Clemente Coronado is a 55 y.o. male.    Chief Complaint: Follow-up of the Right Shoulder (Referred by Dr. Trevizo. U/S to check for pocket suture abscess. Right shoulder sx done 12/03/2024. Here today for an aspiration. )     Last Surgery: No surgery found  Last Surgery Date: No surgery found    Mr. Coronado is a very pleasant 54-year-old male coming in today for a diagnostic ultrasound and evaluation of his right shoulder.  He recently had surgery with Dr. Trevizo on 12/3/2024 and is coming in because he is having some drainage from the incision site.  He recently started antibiotics for a possible infection.  He has granulation tissue and has noticed both clear and purulent drainage soaking through his bandages.  He was sent here for an ultrasound and possible aspiration.  He has minimal pain.  No fevers. After discussing his treatment options we eventually agreed to perform a diagnostic ultrasound and it did reveal a small pocket of fluid with what appeared to be a stalk reaching down towards the rotator cuff.  I attempted an ultrasound-guided needle aspiration which yielded minimal fluid, which we will send to the lab for culture.  We redressed the wound and incision.  He is going to continue his antibiotics.  I notified his surgeon and they are going to contact him later today to discuss potential options moving forward.  He is going to follow-up with me as needed moving forward.    Update on 4/15/2025.  Mr. Coronado is coming back in for a follow-up visit for his acute on chronic right shoulder pain.  He was referred back here by Dr. Trevizo because they are considering doing a rotator cuff repair surgery.  He is here for an aspiration to rule out/screen for infection.  He has completed antibiotics and has no infectious symptoms.  He still is weakness with abduction.  No other complaints at this time.    Right Shoulder         Objective   Right Shoulder Exam     Tenderness   The patient is  experiencing no tenderness.    Range of Motion   The patient has normal right shoulder ROM.    Muscle Strength   Abduction: 3/5   Supraspinatus: 3/5   Biceps: 5/5     Tests   Araujo test: negative  Impingement: negative    Other   Erythema: absent  Sensation: normal      Left Shoulder Exam     Tenderness   The patient is experiencing no tenderness.            No evidence of infection.  The area of erythema over his prior incision has resolved.  Scar has healed well.    Image Results:  Point of Care Ultrasound  These images are not reportable by radiology and will not be interpreted   by  Radiologists.    No new imaging today    Patient ID: Clemente Coronado is a 55 y.o. male.    L Inj/Asp: R glenohumeral on 4/15/2025 10:14 AM  Indications: pain  Details: 20 G (Spinal) needle, ultrasound-guided posterior approach  Medications: 2 mL lidocaine 10 mg/mL (1 %)  Aspirate: 0 mL blood-tinged  Outcome: tolerated well, no immediate complications    I first ultrasounded the supraspinatus and lateral shoulder with the linear probe.  Evidence of rotator cuff tearing but no evidence of infection.  The fluid pocket has resolved.  I then switched to the curvilinear probe to assess the glenohumeral joint.  No effusion seen.  Aspiration was performed and only yielded 1 or 2 drops of blood-tinged fluid.  Nothing purulent.  Not enough to send to the lab.  Procedure, treatment alternatives, risks and benefits explained, specific risks discussed. Consent was given by the patient. Immediately prior to procedure a time out was called to verify the correct patient, procedure, equipment, support staff and site/side marked as required. Patient was prepped and draped in the usual sterile fashion.           Assessment/Plan   Encounter Diagnoses:  Direct infection of right shoulder in infectious and parasitic diseases classified elsewhere (Multi)    Tear of right rotator cuff, unspecified tear extent, unspecified whether traumatic    Orders Placed  This Encounter    L Inj/Asp    Point of Care Ultrasound     No follow-ups on file.      We discussed his treatment options and eventually attempted an aspiration of the right shoulder joint.  I got 1 to 2 drops of blood-tinged fluid but not enough to send to the lab.  The fluid pocket that was there previously has resolved.  This is all reassuring.  Nothing purulent was aspirated.  I have notified Dr. Trevizo. Mr. Coronado is going to follow-up with me as needed moving forward.  And is even considering coming back for a PRP consult for his knee.    ** Please excuse any errors in grammar or translation related to this dictation. Voice recognition software was utilized to prepare this document. **       Balta Ding MD  [facility] Sports Medicine

## 2025-04-16 ENCOUNTER — TELEPHONE (OUTPATIENT)
Dept: SURGERY | Facility: CLINIC | Age: 55
End: 2025-04-16
Payer: COMMERCIAL

## 2025-04-16 DIAGNOSIS — Z12.11 SCREENING FOR COLON CANCER: Primary | ICD-10-CM

## 2025-04-16 NOTE — TELEPHONE ENCOUNTER
Pt called and says he doesn't have prep for his colonoscopy that is this Friday 4/18/25 He wants it sent to his pharmacy [facility] [phone] [place]

## 2025-04-17 ENCOUNTER — TELEPHONE (OUTPATIENT)
Dept: SURGERY | Facility: CLINIC | Age: 55
End: 2025-04-17
Payer: COMMERCIAL

## 2025-04-17 RX ORDER — SODIUM, POTASSIUM,MAG SULFATES 17.5-3.13G
SOLUTION, RECONSTITUTED, ORAL ORAL
Qty: 354 ML | Refills: 0 | Status: SHIPPED | OUTPATIENT
Start: 2025-04-17 | End: 2025-04-18 | Stop reason: ALTCHOICE

## 2025-04-17 NOTE — TELEPHONE ENCOUNTER
Left a detailed message with patient's wife that prep has been sent to the pharmacy, that a letter with instructions to follow were sent to patient's MyChart.  Provided spouse with my direct phone number for return call with any further questions or concerns.

## 2025-04-18 ENCOUNTER — HOSPITAL ENCOUNTER (OUTPATIENT)
Dept: GASTROENTEROLOGY | Facility: HOSPITAL | Age: 55
Discharge: HOME | End: 2025-04-18
Payer: COMMERCIAL

## 2025-04-18 ENCOUNTER — TELEPHONE (OUTPATIENT)
Dept: SURGERY | Facility: CLINIC | Age: 55
End: 2025-04-18
Payer: COMMERCIAL

## 2025-04-18 ENCOUNTER — ANESTHESIA (OUTPATIENT)
Dept: GASTROENTEROLOGY | Facility: HOSPITAL | Age: 55
End: 2025-04-18
Payer: COMMERCIAL

## 2025-04-18 ENCOUNTER — ANESTHESIA EVENT (OUTPATIENT)
Dept: GASTROENTEROLOGY | Facility: HOSPITAL | Age: 55
End: 2025-04-18
Payer: COMMERCIAL

## 2025-04-18 VITALS
OXYGEN SATURATION: 100 % | SYSTOLIC BLOOD PRESSURE: 121 MMHG | DIASTOLIC BLOOD PRESSURE: 70 MMHG | RESPIRATION RATE: 18 BRPM | HEART RATE: 62 BPM | TEMPERATURE: 96.8 F

## 2025-04-18 DIAGNOSIS — Z12.11 COLON CANCER SCREENING: ICD-10-CM

## 2025-04-18 PROCEDURE — 7100000009 HC PHASE TWO TIME - INITIAL BASE CHARGE: Performed by: SURGERY

## 2025-04-18 PROCEDURE — 7100000010 HC PHASE TWO TIME - EACH INCREMENTAL 1 MINUTE: Performed by: SURGERY

## 2025-04-18 PROCEDURE — 3700000001 HC GENERAL ANESTHESIA TIME - INITIAL BASE CHARGE: Performed by: SURGERY

## 2025-04-18 PROCEDURE — 2500000004 HC RX 250 GENERAL PHARMACY W/ HCPCS (ALT 636 FOR OP/ED): Performed by: ANESTHESIOLOGIST ASSISTANT

## 2025-04-18 PROCEDURE — 45378 DIAGNOSTIC COLONOSCOPY: CPT | Performed by: SURGERY

## 2025-04-18 PROCEDURE — 3700000002 HC GENERAL ANESTHESIA TIME - EACH INCREMENTAL 1 MINUTE: Performed by: SURGERY

## 2025-04-18 RX ORDER — PROPOFOL 10 MG/ML
INJECTION, EMULSION INTRAVENOUS AS NEEDED
Status: DISCONTINUED | OUTPATIENT
Start: 2025-04-18 | End: 2025-04-18

## 2025-04-18 RX ORDER — LIDOCAINE HCL/PF 100 MG/5ML
SYRINGE (ML) INTRAVENOUS AS NEEDED
Status: DISCONTINUED | OUTPATIENT
Start: 2025-04-18 | End: 2025-04-18

## 2025-04-18 RX ADMIN — LIDOCAINE HYDROCHLORIDE 60 MG: 20 INJECTION, SOLUTION INTRAVENOUS at 09:21

## 2025-04-18 RX ADMIN — PROPOFOL 140 MCG/KG/MIN: 10 INJECTION, EMULSION INTRAVENOUS at 09:22

## 2025-04-18 RX ADMIN — PROPOFOL 100 MG: 10 INJECTION, EMULSION INTRAVENOUS at 09:21

## 2025-04-18 SDOH — HEALTH STABILITY: MENTAL HEALTH: CURRENT SMOKER: 0

## 2025-04-18 ASSESSMENT — COLUMBIA-SUICIDE SEVERITY RATING SCALE - C-SSRS
6. HAVE YOU EVER DONE ANYTHING, STARTED TO DO ANYTHING, OR PREPARED TO DO ANYTHING TO END YOUR LIFE?: NO
1. IN THE PAST MONTH, HAVE YOU WISHED YOU WERE DEAD OR WISHED YOU COULD GO TO SLEEP AND NOT WAKE UP?: NO
2. HAVE YOU ACTUALLY HAD ANY THOUGHTS OF KILLING YOURSELF?: NO

## 2025-04-18 ASSESSMENT — PAIN - FUNCTIONAL ASSESSMENT
PAIN_FUNCTIONAL_ASSESSMENT: 0-10

## 2025-04-18 ASSESSMENT — PAIN SCALES - GENERAL
PAINLEVEL_OUTOF10: 3
PAIN_LEVEL: 0

## 2025-04-18 NOTE — TELEPHONE ENCOUNTER
Left a detailed message with patient regarding his colonoscopy prep and whether he picked it up or not.  I requested a call back to the office and if he did not get his prep than I will be cancelling his appointment.  Provided patient with office phone number for return call.

## 2025-04-18 NOTE — POST-PROCEDURE NOTE
Dr. Sheldon spoke to pt and wife at bedside post procedure in pacu. Discharge instructions reviewed and paperwork provided in folder to take home.

## 2025-04-18 NOTE — ANESTHESIA POSTPROCEDURE EVALUATION
Patient: Clemente Coronado    Procedure Summary       Date: 04/18/25 Room / Location: [facility]    Anesthesia Start: 0919 Anesthesia Stop: 0941    Procedure: COLONOSCOPY Diagnosis: Colon cancer screening    Scheduled Providers: Aman Sheldon MD PhD; Marky Raymond MD Responsible Provider: Marky Raymond MD    Anesthesia Type: MAC ASA Status: 3            Anesthesia Type: MAC    Vitals Value Taken Time   BP 99/55 04/18/25 09:41   Temp 36 °C (96.8 °F) 04/18/25 09:41   Pulse 60 04/18/25 09:41   Resp 18 04/18/25 09:41   SpO2 98 % 04/18/25 09:41       Anesthesia Post Evaluation    Patient location during evaluation: PACU  Patient participation: complete - patient participated  Level of consciousness: awake  Pain score: 0  Pain management: adequate  Airway patency: patent  Cardiovascular status: acceptable  Respiratory status: acceptable  Hydration status: acceptable  Postoperative Nausea and Vomiting: none        There were no known notable events for this encounter.

## 2025-04-18 NOTE — ANESTHESIA PREPROCEDURE EVALUATION
Patient: Clemente Coronado    Procedure Information       Date/Time: 04/18/25 0900    Scheduled providers: Aman Sheldon MD PhD; Marky Raymond MD    Procedure: COLONOSCOPY    Location: [facility]            Relevant Problems   Cardiac   (+) Hypertension, essential      Neuro   (+) Intervertebral disc disorder with radiculopathy of lumbosacral region      /Renal   (+) Benign prostatic hyperplasia      Hematology   (+) Burkitt's tumor of extranodal and/or solid organ site (Multi)      Musculoskeletal   (+) Primary osteoarthritis of left shoulder   (+) Spinal stenosis of lumbar region      ID   (+) Direct infection of right shoulder in infectious and parasitic diseases classified elsewhere (Multi)   (+) Joint infection (Multi)       Clinical information reviewed:    Allergies  Meds               NPO Detail:  NPO/Void Status  Date of Last Liquid: 04/17/25  Time of Last Liquid: 2300  Date of Last Solid: 04/16/25  Time of Last Solid: 1800         Physical Exam    Airway  Mallampati: II  TM distance: >3 FB  Neck ROM: full     Cardiovascular - normal exam  Rhythm: regular  Rate: normal     Dental    Pulmonary - normal exam   Abdominal            Anesthesia Plan    History of general anesthesia?: yes  History of complications of general anesthesia?: no    ASA 3     MAC     The patient is not a current smoker.  Education provided regarding risk of obstructive sleep apnea.  intravenous induction   Anesthetic plan and risks discussed with patient.    Plan discussed with CRNA, CAA and attending.

## 2025-04-18 NOTE — H&P
History Of Present Illness  Clemente Coronado is a 55 y.o. male presenting to [facility] for screening colonoscopy. Doing well this morning. Says he completed prep without issues. No recent changes to medical history.      Past Medical History  Medical History[1]  HTN  BPH  Burkitt's lymphoma   Arthritis     Surgical History  Surgical History[2]  No abdominal surgery      Social History  He reports that he has never smoked. He has never used smokeless tobacco. He reports current alcohol use. He reports that he does not currently use drugs.    Family History  Family History[3]     Allergies  Patient has no known allergies.    Review of Systems   All other systems reviewed and are negative.       Physical Exam  Constitutional:       General: He is not in acute distress.  HENT:      Head: Normocephalic and atraumatic.      Mouth/Throat:      Mouth: Mucous membranes are moist.   Eyes:      Extraocular Movements: Extraocular movements intact.   Cardiovascular:      Rate and Rhythm: Normal rate.   Pulmonary:      Effort: No respiratory distress.   Abdominal:      General: There is no distension.   Musculoskeletal:         General: Normal range of motion.   Skin:     General: Skin is warm.   Neurological:      General: No focal deficit present.      Mental Status: He is alert.          Last Recorded Vitals  There were no vitals taken for this visit.    Relevant Results             Assessment & Plan  Colon cancer screening      Proceed with screening colonoscopy. Consent obtained in preop.         Selin Butler MD         [1]   Past Medical History:  Diagnosis Date    Hypertension     Lymphoma 2005    OA (osteoarthritis)     Rotator cuff syndrome    [2]   Past Surgical History:  Procedure Laterality Date    ARTHROSCOPY SHOULDER W/ OPEN ROTATOR CUFF REPAIR Bilateral     KNEE ARTHROPLASTY      KNEE ARTHROSCOPY W/ DEBRIDEMENT Bilateral     8 total knee arthroscopies    ROTATOR CUFF REPAIR      SHOULDER ARTHROSCOPY W/ ROTATOR CUFF  REPAIR Right 12/2024    complicated by infection.  reoperated 1/2025    TONSILLECTOMY      TOTAL KNEE ARTHROPLASTY Left 2023   [3]   Family History  Problem Relation Name Age of Onset    Other (HTN) Mother      Hypothyroidism Mother      Other (HTN) Father      Benign prostatic hyperplasia Father      Other (head and neck cancer) Brother          Jimmy.  HPV

## 2025-05-07 ENCOUNTER — TELEPHONE (OUTPATIENT)
Dept: ORTHOPEDIC SURGERY | Facility: CLINIC | Age: 55
End: 2025-05-07
Payer: COMMERCIAL

## 2025-05-07 NOTE — TELEPHONE ENCOUNTER
Clemente Coronado called in requesting updated disability paperwork. Paperwork has been received via fax, filled out and faxed back to [phone]. I also called Clemente Coronado and left a voicemail advising him of this. I tentatively scheduled him for 5/14/25 for follow up to discuss MRI and possible rotator cuff revision surgery. I advised him via voicemail if this appointment does not work for him he can call us back to get rescheduled.

## 2025-05-12 ENCOUNTER — APPOINTMENT (OUTPATIENT)
Dept: ORTHOPEDIC SURGERY | Facility: CLINIC | Age: 55
End: 2025-05-12
Payer: COMMERCIAL

## 2025-05-12 DIAGNOSIS — M75.101 TEAR OF RIGHT ROTATOR CUFF, UNSPECIFIED TEAR EXTENT, UNSPECIFIED WHETHER TRAUMATIC: ICD-10-CM

## 2025-05-12 DIAGNOSIS — S46.011D TRAUMATIC COMPLETE TEAR OF RIGHT ROTATOR CUFF, SUBSEQUENT ENCOUNTER: Primary | ICD-10-CM

## 2025-05-12 PROCEDURE — 1036F TOBACCO NON-USER: CPT | Performed by: STUDENT IN AN ORGANIZED HEALTH CARE EDUCATION/TRAINING PROGRAM

## 2025-05-14 ENCOUNTER — APPOINTMENT (OUTPATIENT)
Dept: ORTHOPEDIC SURGERY | Age: 55
End: 2025-05-14
Payer: COMMERCIAL

## 2025-05-16 DIAGNOSIS — M75.101 TEAR OF RIGHT ROTATOR CUFF, UNSPECIFIED TEAR EXTENT, UNSPECIFIED WHETHER TRAUMATIC: ICD-10-CM

## 2025-05-16 RX ORDER — MELOXICAM 15 MG/1
15 TABLET ORAL DAILY
Qty: 30 TABLET | Refills: 2 | Status: SHIPPED | OUTPATIENT
Start: 2025-05-16 | End: 2025-08-14

## 2025-05-20 NOTE — PROGRESS NOTES
PRIMARY CARE PHYSICIAN: Shakeel Coronado DO    ORTHOPAEDIC POSTOPERATIVE VISIT    ASSESSMENT & PLAN    Impression: 55 y.o. male 3.5 months postop s/p Right shoulder arthroscopic irrigation, extensive intra-articular debridement and synovectomy done 1/14/25 after infection of a Right shoulder arthroscopy, rotator cuff repair, subacromial decompression acromioplasty, extensive intra-articular debridement and synovectomy done 12/03/24.    Plan:   He is doing well with regards to his infection which he seems now to have cleared.  Recent ultrasound and ultrasound-guided aspiration of the shoulder performed by my primary care sports medicine colleague was dry without any notable fluid in the shoulder to be aspirated.  An aspiration attempt was performed but again, this was dry.  There are no further clinical examination findings concerning for infection.  He is still having pain related to his unhealed rotator cuff secondary to infection.  Now that the infection is cleared, we will plan on revisiting his torn rotator cuff.  First, we will proceed with a repeat MRI of the right shoulder to evaluate the status of his rotator cuff, extent of the rotator cuff tear as well as the other surrounding soft tissue structures in the shoulder in anticipation of revision arthroscopic rotator cuff repair.  Additionally, I recommend that he seek a second opinion from one of my shoulder colleagues and he agrees.  We will get him set up with my shoulder specialist colleague Dr. Hanson.  He will then return to see me after the second opinion in the MRI to discuss next treatment steps including the planned right shoulder arthroscopic revision rotator cuff repair.  He is significant limited by the shoulder and his current state which affects his ability to perform his duties at work and his normal daily activities.    I reviewed their postoperative timeline and plan with them. They understand the postoperative precautions and the treatment  plan going forward.     At the end of the visit, all questions were answered in full. The patient is in agreement with the plan and recommendations. They will call the office with any questions/concerns.    Note dictated with [employer]wing[employer] software. Completed without full typed error editing and sent to avoid delay.      SUBJECTIVE  CHIEF COMPLAINT: Post-op     HPI: Clemente Coronado is a 55 y.o. patient who is now 3.5 months postop s/p Right shoulder arthroscopic irrigation, extensive intra-articular debridement and synovectomy done 1/14/25 after infection of a Right shoulder arthroscopy, rotator cuff repair, subacromial decompression acromioplasty, extensive intra-articular debridement and synovectomy done 12/03/24. He is still experiencing significant right shoulder pain, especially when at work.  It appears as though he is now clear the infection has recent diagnostic ultrasound and ultrasound-guided aspiration were negative for any notable fluid or collections.  He continues to have difficulty with anything above shoulder height.  Deonte denies any new injury to the shoulder since his last visit.         REVIEW OF SYSTEMS  Constitutional: See HPI for pain assessment, No significant weight loss, recent trauma  Cardiovascular: No chest pain, shortness of breath  Respiratory: No difficulty breathing, cough  Gastrointestinal: No nausea, vomiting, diarrhea, constipation  Musculoskeletal: Noted in HPI, positive for pain, restricted motion, stiffness  Integumentary: No rashes, easy bruising, redness   Neurological: no numbness or tingling in extremities, no gait disturbances   Psychiatric: No mood changes, memory changes, social issues  Heme/Lymph: no excessive swelling, easy bruising, excessive bleeding  ENT: No hearing changes  Eyes: No vision changes    CURRENT MEDICATIONS:   Current Outpatient Medications   Medication Sig Dispense Refill    lisinopril 20 mg tablet Take 1 tablet (20 mg) by mouth once daily.  "90 tablet 3    meloxicam (Mobic) 15 mg tablet Take 1 tablet (15 mg) by mouth once daily. 30 tablet 2     No current facility-administered medications for this visit.        OBJECTIVE    PHYSICAL EXAM      2/3/2025     1:30 PM 2/5/2025     1:21 PM 3/7/2025     2:07 PM 4/15/2025     9:16 AM 4/18/2025     8:26 AM 4/18/2025     9:41 AM 4/18/2025    10:00 AM   Vitals   Systolic 125    189 99 121   Diastolic 77    91 55 70   Heart Rate 69    77 60 62   Temp      36 °C (96.8 °F)    Resp 16    18 18 18   Height  1.905 m (6' 3\") 1.905 m (6' 3\") 1.905 m (6' 3\")      Weight (lb)  214 216 216      BMI  26.75 kg/m2 27 kg/m2 27 kg/m2      BSA (m2)  2.27 m2 2.28 m2 2.28 m2      Visit Report  Report Report Report         There is no height or weight on file to calculate BMI.    Prior in office examination demonstrates:    General: Well-appearing, no acute distress    Skin intact bilateral upper and lower extremities  No erythema  No warmth    Detailed examination of the right shoulder demonstrates:  Surgical incision(s) well-healed  No ecchymosis  No notable soft tissue swelling  Biceps contour normal  Shoulder range of motion: Forward flexion to 140 with a significant hitch at 90, ER 30, IR upper lumbar  Jobes 4-/5, ER 4+/5, belly press 4+/5  Upper extremity motor grossly intact  C5-T1 sensation intact bilaterally  2+ radial pulses bilaterally  Warm and well-perfused, brisk capillary refill    IMAGING:   No new imaging    "

## 2025-05-22 ENCOUNTER — DOCUMENTATION (OUTPATIENT)
Dept: PHYSICAL THERAPY | Facility: CLINIC | Age: 55
End: 2025-05-22
Payer: COMMERCIAL

## 2025-05-22 NOTE — PROGRESS NOTES
Physical Therapy    Discharge Summary    Name: Clemente Coronado  MRN: [id number]  : 1970  Date: 25    Discharge Summary: PT    Discharge Information: Date of discharge 25        Rehab Discharge Reason: Other Pt has post surgical complications from surgery. Noted in his chart-pt is to have MRI and see Dr. Hanson

## 2025-06-05 ENCOUNTER — TELEPHONE (OUTPATIENT)
Dept: ORTHOPEDIC SURGERY | Age: 55
End: 2025-06-05
Payer: COMMERCIAL

## 2025-06-05 NOTE — TELEPHONE ENCOUNTER
Multiple attempts have been made to reach Clemente Coronado to remind him to schedule his updated MRI and second opinion with Dr. Hanson without response.

## 2025-06-18 ENCOUNTER — TELEPHONE (OUTPATIENT)
Dept: ORTHOPEDIC SURGERY | Age: 55
End: 2025-06-18
Payer: COMMERCIAL

## 2025-06-18 NOTE — TELEPHONE ENCOUNTER
Clemente Coronado office called requesting for us to fill out 4 separate disability forms for him. Fax received with forms and blank forms have been scanned into chart. We are currently waiting for Clemente Coronado to call us back to confirm what restrictions he requires for these forms.     He will need a visit scheduled with Dr. Hanson for a second opinion and a follow up with Dr. Trevizo as well as he does not currently have either scheduled.

## 2025-06-20 NOTE — TELEPHONE ENCOUNTER
Called and spoke to Clemente Coronado    All disability forms have been completed, scanned into chart and faxed to each company. Disability restrictions placed on forms from 3/7/25- TBD pending MRI and second opinion.     Dr. Hanson's office will be reaching out to get him scheduled for a second opinion. He states he will also be seeking a 3rd opinion elsewhere. He will call us when he is ready to follow up with Dr. Trevizo to discuss possible revision rotator cuff repair.

## 2025-06-24 ENCOUNTER — HOSPITAL ENCOUNTER (OUTPATIENT)
Dept: RADIOLOGY | Facility: HOSPITAL | Age: 55
Discharge: HOME | End: 2025-06-24
Payer: COMMERCIAL

## 2025-06-24 DIAGNOSIS — M75.101 TEAR OF RIGHT ROTATOR CUFF, UNSPECIFIED TEAR EXTENT, UNSPECIFIED WHETHER TRAUMATIC: ICD-10-CM

## 2025-06-24 PROCEDURE — 73221 MRI JOINT UPR EXTREM W/O DYE: CPT | Mod: RT

## 2025-07-01 ENCOUNTER — OFFICE VISIT (OUTPATIENT)
Dept: ORTHOPEDIC SURGERY | Facility: HOSPITAL | Age: 55
End: 2025-07-01
Payer: COMMERCIAL

## 2025-07-01 DIAGNOSIS — M25.511 RIGHT SHOULDER PAIN, UNSPECIFIED CHRONICITY: Primary | ICD-10-CM

## 2025-07-01 PROCEDURE — 99212 OFFICE O/P EST SF 10 MIN: CPT | Performed by: ORTHOPAEDIC SURGERY

## 2025-07-01 PROCEDURE — 99215 OFFICE O/P EST HI 40 MIN: CPT | Performed by: ORTHOPAEDIC SURGERY

## 2025-07-01 NOTE — PROGRESS NOTES
Subjective   Patient ID: Clemente Coronado is a 55 y.o. male    Chief Complaint: No chief complaint on file.       Last Surgery: Right shoulder arthroscopic irrigation, extensive intra-articular debridement and synovectomy - Right  Date of Last Surgery: 1/14/2025    HPI  Clemente Coronaod is a 55 y.o. right hand dominant male presenting for right shoulder pain. He is s/p right shoulder arthroscopy, rotator cuff repair, subacromial decompression acromioplasty, extensive intra-articular debridement and synovectomy  and rotator cuff repair with augmentation done 12/03/24; s/p right shoulder arthroscopic irrigation, extensive intra-articular debridement and synovectomy done 1/14/25 both done with Dr. Trevizo. He is currently an orthodontist.     He was kindly referred by Dr. Trevizo for a second opinion regarding his unhealed rotator cuff and right shoulder pain. He has brought his MRI for review today. He continues to have pain and weakness that is affecting his work. He takes Meloxicam and Advil daily. He was on IV antibiotics for his original infection and is not currently on any suppression antibiotics. Left shoulder managed years ago by [facility] sounds to be a rotator cuff repair?    Objective   Patient is a well-developed, well-nourished male  in no acute distress.  Breathes with normal chest rises.  Pupils are round and symmetric today.  Awake, alert, and oriented x3.      Examination of the right shoulder today reveals the skin to be intact. There is no sign of any atrophy, lesions, or abrasions. There is no pain to palpation of the bony prominences. Cervical lymphadenopathy examined, and this was negative. Patient had 5 out of 5 wrist flexion, extension, and thumb extension bilaterally. Sensation was intact to light touch to median, ulnar, radial axillary, and musculocutaneous nerves bilaterally. Positive radial pulse bilaterally. Range of motion of the right shoulder revealed 0-140° of forward elevation, 0-30° of external  rotation, and internal rotation was 5 levels short. +Hawkin's. _modified belly press, well healed incisions, no erythemea or armth; prominent humerus anterior    Examination of the left shoulder today reveals the skin to be intact. Well healed incisionsThere is no pain to palpation of the bony prominences. Cervical lymphadenopathy examined, and this was negative. Patient had 5 out of 5 wrist flexion, extension, and thumb extension, bilaterally. Sensation was intact to light touch to median, ulnar, radial, axillary, and musculocutaneous nerves bilaterally. Positive radial pulse bilaterally. Range of motion of the left shoulder revealed 0-170° of forward elevation, 0-60° of external rotation, and internal rotation up to T-12. 4+/5 strength      Imaging:  MRI of the RIGHT shoulder taken 06/24/25 obtained and personally interpreted by me show a massive cuff tear of the supraspinatus, retracted to the level of the glenoid. Grade 0-1 fatty degeneration of the supraspinatus. Grade 3-4 fatty degeneration of the subscapularis. Infra teres minor appear intact. Minimal signs of arthrosis. Post surgical changes    Assessment/Plan   No diagnosis found.  Patient s/p arthroscopic repair with subsequent infection that is now cleared, this was the second ; Cellular dermis allegedly on left on the  left side with Dr. Washington    We had a long discussion regarding his options. His subscapularis is completely torn and the supraspinatus is retracted in a medial fashion. Interestingly there is only Grade 0-1 fatty degeneration of the supra and grade 3-4 of the subscapularis. We discussed his 3 options. He may continue to live with this, consider an arthroscopic debridement, biopsy and culture where we would go in and clean up scar tissue and remove some repair material from previous surgery. This may help a little. He would have no limitations after surgery. If that does not give him what he wants and cultures are negative he may consider  a reverse replacement. His final option would be to consider having his supraspinatus repaired with augmentation. He understands that any addition of biology increases his chance of infection. Again he understands that his subscapularis is not repairable or healable at this point. He could consider the muscle transfer to supplement his supraspinatus in future if that successfully heals  . This would be a staged procedure. There is no indication for anterior muscle transfer if top rotator cuff does not heal. He also understands I wouldn't adovcate for a dual muscle transfer for him although some of my colleagues do champion it.  He understands all options and that not all are great. I did assure him that he was in the hands of a good surgeon and that this was an ubnfortunate, rare but known complication of rotator cuff surgery.  I explained that I also had a patient who had an infection and they were young and healthy like he is. He will follow up as needed     No orders of the defined types were placed in this encounter.        Follow up as needed    Scribe Attestation  By signing my name below, I, Ashanti Sy , Scribe   attest that this documentation has been prepared under the direction and in the presence of Donaldo Hanson MD.

## 2025-07-17 ENCOUNTER — TELEPHONE (OUTPATIENT)
Dept: ORTHOPEDIC SURGERY | Age: 55
End: 2025-07-17
Payer: COMMERCIAL

## 2025-07-17 NOTE — TELEPHONE ENCOUNTER
Clemente Coronado  called requesting disability forms be sent in with updates. She will be faxing us another copy of the disability forms. I advised her that Clemente Coronado will need to follow up with our office to discuss next steps as he was advised to do this following his visit with Dr. Hanson on 7/01/25. Disability paperwork currently does not have end dates or updates on it as we were waiting for a new MRI and second opinion to be completed. These have since been completed and Clemente Coronado has not contacted our office for follow up. I advised her that I will need to check with Dr. Trevizo to see if we can continue to fill our forms without seeing him. He has not been seen in office since April and the last office visit was a virtual visit in May.     Dr. Trevizo, how would you like to fill out his disability forms if he is not currently scheduled to return to our office for any future treatment ?

## 2025-08-04 ENCOUNTER — APPOINTMENT (OUTPATIENT)
Dept: ORTHOPEDIC SURGERY | Facility: CLINIC | Age: 55
End: 2025-08-04
Payer: COMMERCIAL

## 2025-08-04 VITALS — HEIGHT: 75 IN | BODY MASS INDEX: 26.73 KG/M2 | WEIGHT: 215 LBS

## 2025-08-04 DIAGNOSIS — S46.011D TRAUMATIC COMPLETE TEAR OF RIGHT ROTATOR CUFF, SUBSEQUENT ENCOUNTER: Primary | ICD-10-CM

## 2025-08-04 PROCEDURE — 1036F TOBACCO NON-USER: CPT | Performed by: STUDENT IN AN ORGANIZED HEALTH CARE EDUCATION/TRAINING PROGRAM

## 2025-08-04 PROCEDURE — 3008F BODY MASS INDEX DOCD: CPT | Performed by: STUDENT IN AN ORGANIZED HEALTH CARE EDUCATION/TRAINING PROGRAM

## 2025-08-04 ASSESSMENT — PAIN - FUNCTIONAL ASSESSMENT: PAIN_FUNCTIONAL_ASSESSMENT: 0-10

## 2025-08-04 ASSESSMENT — PAIN SCALES - GENERAL: PAINLEVEL_OUTOF10: 1

## 2025-08-04 NOTE — PROGRESS NOTES
PRIMARY CARE PHYSICIAN: Shakeel Coronado DO    ORTHOPAEDIC POSTOPERATIVE VISIT    ASSESSMENT & PLAN    Impression: 55 y.o. male 6.5+ months postop s/p Right shoulder arthroscopic irrigation, extensive intra-articular debridement and synovectomy done 1/14/25 after infection of a Right shoulder arthroscopy, rotator cuff repair, subacromial decompression acromioplasty, extensive intra-articular debridement and synovectomy done 12/03/24.    Plan:   He seems to have cleared his infection.  He had a follow-up evaluation/second opinion with one of my shoulder specialist colleagues.  They had a long discussion regarding his treatment options going forward.  I reviewed his MRI findings with him again in the office today.  He has a significant full-thickness retracted subscapularis tear that would likely not heal with a revision surgery.  It does appear that his supraspinatus may be repairable but given the history of infection and some chronicity of this, this does have a fairly high risk of nonhealing.  His most reliable treatment for this may be a reverse total shoulder arthroplasty.  Prior to doing this, I would recommend an arthroscopic debridement of the shoulder to confirm that the infection is cleared as well as to see if an arthroscopic debridement alone may provide him with some sufficient relief to buy him some time prior to shoulder arthroplasty.  This is all in agreement with my colleague Dr. Hanson's second opinion.  I answered all his questions related to this.  He would like to think things through.  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  I am happy to help him through this process in any way possible.  I also discussed with him that if he would like to proceed with surgery with my colleague I am happy to  help facilitate that as well.  He is in agreement and appreciative of his care.  Unfortunately, infection is a risk of any surgery and in this case, the infection has created a very difficult and challenging situation for the patient. He is significantly limited by the shoulder and his current state which affects his ability to perform his duties at work and his normal daily activities.    At the end of the visit, all questions were answered in full. The patient is in agreement with the plan and recommendations. They will call the office with any questions/concerns.    Note dictated with [employer] software. Completed without full typed error editing and sent to avoid delay.      SUBJECTIVE  CHIEF COMPLAINT: Post-op     HPI: Clemente Coronado is a 55 y.o. patient who is now 6.5+ months postop s/p Right shoulder arthroscopic irrigation, extensive intra-articular debridement and synovectomy done 1/14/25 after infection of a Right shoulder arthroscopy, rotator cuff repair, subacromial decompression acromioplasty, extensive intra-articular debridement and synovectomy done 12/03/24. They were seen by Dr. Hanson who stated they could do a debridement as a staged procedure prior to a reverse total shoulder arthroplasty or continue with nonoperative management as long as possible.  It may be possible to repair the supraspinatus based on his MRI but unlikely that the subscapularis would be able to be reduced and healed given the amount of retraction and chronicity of the tear. Pain is sometimes controlled with the current treatment plan, worsens with movements. Reports numbness/tingling in their hand. They want to discuss their options. MRI done 6/24/2025.  He is having difficulty getting through his duties at work given the strenuous nature of his job.    Please see below for full history from prior visit:    5/12/2025 Telemed visit: He is still experiencing significant right shoulder pain, especially when at  "work.  It appears as though he is now clear the infection has recent diagnostic ultrasound and ultrasound-guided aspiration were negative for any notable fluid or collections.  He continues to have difficulty with anything above shoulder height.  Deonte denies any new injury to the shoulder since his last visit.         REVIEW OF SYSTEMS  Constitutional: See HPI for pain assessment, No significant weight loss, recent trauma  Cardiovascular: No chest pain, shortness of breath  Respiratory: No difficulty breathing, cough  Gastrointestinal: No nausea, vomiting, diarrhea, constipation  Musculoskeletal: Noted in HPI, positive for pain, restricted motion, stiffness  Integumentary: No rashes, easy bruising, redness   Neurological: no numbness or tingling in extremities, no gait disturbances   Psychiatric: No mood changes, memory changes, social issues  Heme/Lymph: no excessive swelling, easy bruising, excessive bleeding  ENT: No hearing changes  Eyes: No vision changes    CURRENT MEDICATIONS:   Current Outpatient Medications   Medication Sig Dispense Refill    lisinopril 20 mg tablet Take 1 tablet (20 mg) by mouth once daily. 90 tablet 3    meloxicam (Mobic) 15 mg tablet Take 1 tablet (15 mg) by mouth once daily. 30 tablet 2     No current facility-administered medications for this visit.        OBJECTIVE    PHYSICAL EXAM      2/5/2025     1:21 PM 3/7/2025     2:07 PM 4/15/2025     9:16 AM 4/18/2025     8:26 AM 4/18/2025     9:41 AM 4/18/2025    10:00 AM 8/4/2025    10:27 AM   Vitals   Systolic    189 99 121    Diastolic    91 55 70    Heart Rate    77 60 62    Temp     36 °C (96.8 °F)     Resp    18 18 18    Height 1.905 m (6' 3\") 1.905 m (6' 3\") 1.905 m (6' 3\")    1.905 m (6' 3\")   Weight (lb) 214 216 216    215   BMI 26.75 kg/m2 27 kg/m2 27 kg/m2    26.87 kg/m2   BSA (m2) 2.27 m2 2.28 m2 2.28 m2    2.27 m2   Visit Report Report Report Report    Report      Body mass index is 26.87 kg/m².    Prior in office examination " demonstrates:    General: Well-appearing, no acute distress    Skin intact bilateral upper and lower extremities  No erythema  No warmth    Detailed examination of the right shoulder demonstrates:  Surgical incision(s) well-healed  No ecchymosis  No notable soft tissue swelling  Biceps contour normal  Shoulder range of motion: Forward flexion to 140 with a significant hitch at 90, ER 30, IR upper lumbar  Jobes 4-/5, ER 4-/5, belly press 4+/5  Upper extremity motor grossly intact  C5-T1 sensation intact bilaterally  2+ radial pulses bilaterally  Warm and well-perfused, brisk capillary refill    IMAGING:   No new imaging

## (undated) DEVICE — KIT, BEACH CHAIR TRIMANO

## (undated) DEVICE — BLANKET, LOWER BODY, VHA PLUS, ADULT

## (undated) DEVICE — GLOVE, SURGICAL, PROTEXIS PI , 7.5, PF, LF

## (undated) DEVICE — DRESSING, NON-ADHERENT, TELFA, OUCHLESS, 3 X 4 IN, STERILE

## (undated) DEVICE — SUTURE, CTD, VICRYL, 2-0, UND, BR, CT-2

## (undated) DEVICE — GLOVE, SURGICAL, PROTEXIS PI BLUE W/NEUTHERA, 8.0, PF, LF

## (undated) DEVICE — GLOVE, SURGICAL, PROTEXIS PI BLUE W/NEUTHERA, 6.5, PF, LF

## (undated) DEVICE — Device

## (undated) DEVICE — CUTTER, BONE, 5.5 X 13

## (undated) DEVICE — TUBING, SUCTION, 6MM X 10, CLEAN N-COND

## (undated) DEVICE — BANDAGE, ELASTIC, SELF-ADHERENT, COBAN, 2 IN X 5 YD, LATEX, TAN, NS

## (undated) DEVICE — DRAPE, INCISE, ANTIMICROBIAL, IOBAN 2, 13 X 13 IN, DISPOSABLE, STERILE

## (undated) DEVICE — PEN, SKIN MARKER FOR TREPHINES & PUNCHES

## (undated) DEVICE — TUBING, PATIENT 8FT STERILE

## (undated) DEVICE — TUBING, PUMP REDEUCE 8FT STERILE

## (undated) DEVICE — CANNULA, TRIPLE-DAM TWIST-IN, 8.25MM X 7CM

## (undated) DEVICE — SUTURE, MONOCRYL, 3-0, 27 IN, PS-2, UNDYED

## (undated) DEVICE — BLOCK, FOAM, NEEDLE POP -N-COUNT, 1840, BLACK

## (undated) DEVICE — SLING, ARM, LARGE

## (undated) DEVICE — TRIPLEDAM TWIST-IN CANNULA W/ VALVE 8.25MM X 7 CM

## (undated) DEVICE — DRESSING, TRANSPARENT, TEGADERM, FRAME STYLE, 4 X 4.5, STRL

## (undated) DEVICE — PROBE, APOLLO RF, 90 DEG, EXTRA LARTGE

## (undated) DEVICE — STRIP, SKIN CLOSURE, STERI STRIP, REINFORCED, 0.5 X 4 IN

## (undated) DEVICE — GLOVE, PROTEXIS PI CLASSIC, SZ-6.5, PF, LF

## (undated) DEVICE — DRAPE, INSTRUMENT, W/POUCH, STERI DRAPE, 7 X 11 IN, DISPOSABLE, STERILE

## (undated) DEVICE — NEEDLE, HYPODERMIC, MONOJECT, 27 G X 1.5 IN

## (undated) DEVICE — SUTURE PASSR, SELF, FIRSTPASS, STR, DISP

## (undated) DEVICE — DRESSING, GAUZE, FLUFF, 1 PLY, 18 X 36 IN

## (undated) DEVICE — TIP, SUCTION, YANKAUER, FLEXIBLE